# Patient Record
Sex: FEMALE | Race: WHITE | Employment: OTHER
[De-identification: names, ages, dates, MRNs, and addresses within clinical notes are randomized per-mention and may not be internally consistent; named-entity substitution may affect disease eponyms.]

---

## 2019-12-11 ENCOUNTER — RX ONLY (RX ONLY)
Age: 84
End: 2019-12-11

## 2019-12-11 ENCOUNTER — AMBUL SURGICAL CARE (OUTPATIENT)
Dept: URBAN - NONMETROPOLITAN AREA SURGERY 1 | Facility: SURGERY | Age: 84
Setting detail: OPHTHALMOLOGY
End: 2019-12-11
Payer: COMMERCIAL

## 2019-12-11 ENCOUNTER — DOCTOR'S OFFICE (OUTPATIENT)
Dept: URBAN - NONMETROPOLITAN AREA CLINIC 1 | Facility: CLINIC | Age: 84
Setting detail: OPHTHALMOLOGY
End: 2019-12-11
Payer: COMMERCIAL

## 2019-12-11 DIAGNOSIS — H40.033: ICD-10-CM

## 2019-12-11 DIAGNOSIS — H25.22: ICD-10-CM

## 2019-12-11 DIAGNOSIS — H25.11: ICD-10-CM

## 2019-12-11 DIAGNOSIS — H40.032: ICD-10-CM

## 2019-12-11 DIAGNOSIS — H25.041: ICD-10-CM

## 2019-12-11 PROCEDURE — G8918 PT W/O PREOP ORDER IV AB PRO: HCPCS | Performed by: CLINIC/CENTER

## 2019-12-11 PROCEDURE — G8918 PT W/O PREOP ORDER IV AB PRO: HCPCS | Performed by: OPHTHALMOLOGY

## 2019-12-11 PROCEDURE — 99204 OFFICE O/P NEW MOD 45 MIN: CPT | Performed by: OPHTHALMOLOGY

## 2019-12-11 PROCEDURE — 92133 CPTRZD OPH DX IMG PST SGM ON: CPT | Performed by: OPHTHALMOLOGY

## 2019-12-11 PROCEDURE — 66761 REVISION OF IRIS: CPT | Performed by: CLINIC/CENTER

## 2019-12-11 PROCEDURE — G8907 PT DOC NO EVENTS ON DISCHARG: HCPCS | Performed by: CLINIC/CENTER

## 2019-12-11 PROCEDURE — 76512 OPH US DX B-SCAN: CPT | Performed by: OPHTHALMOLOGY

## 2019-12-11 PROCEDURE — 66761 REVISION OF IRIS: CPT | Performed by: OPHTHALMOLOGY

## 2019-12-11 PROCEDURE — G8907 PT DOC NO EVENTS ON DISCHARG: HCPCS | Performed by: OPHTHALMOLOGY

## 2019-12-11 ASSESSMENT — REFRACTION_MANIFEST
OS_VA3: 20/
OD_VA1: 20/
OU_VA: 20/
OS_VA2: 20/
OD_VA3: 20/
OS_VA1: 20/
OU_VA: 20/
OS_VA2: 20/
OD_VA1: 20/
OD_VA2: 20/
OS_VA3: 20/
OS_VA1: 20/
OD_VA3: 20/
OD_VA2: 20/

## 2019-12-11 ASSESSMENT — REFRACTION_CURRENTRX
OD_ADD: +2.75
OD_CYLINDER: 0.00
OD_VPRISM_DIRECTION: BF
OD_AXIS: 180
OS_ADD: +2.75
OS_OVR_VA: 20/
OS_CYLINDER: -1.25
OS_AXIS: 148
OD_OVR_VA: 20/
OD_SPHERE: +3.50
OS_OVR_VA: 20/
OS_VPRISM_DIRECTION: BF
OD_OVR_VA: 20/
OS_OVR_VA: 20/
OS_SPHERE: +3.50
OD_OVR_VA: 20/

## 2019-12-11 ASSESSMENT — REFRACTION_AUTOREFRACTION
OD_AXIS: 78
OS_SPHERE: ERROR
OD_SPHERE: +4.75
OD_CYLINDER: -0.75

## 2019-12-11 ASSESSMENT — SPHEQUIV_DERIVED: OD_SPHEQUIV: 4.375

## 2019-12-11 ASSESSMENT — VISUAL ACUITY
OD_BCVA: 20/HM
OS_BCVA: 20/50--2

## 2019-12-11 ASSESSMENT — CONFRONTATIONAL VISUAL FIELD TEST (CVF)
OD_FINDINGS: FULL
OS_FINDINGS: FULL

## 2020-02-05 ENCOUNTER — DOCTOR'S OFFICE (OUTPATIENT)
Dept: URBAN - NONMETROPOLITAN AREA CLINIC 1 | Facility: CLINIC | Age: 85
Setting detail: OPHTHALMOLOGY
End: 2020-02-05
Payer: COMMERCIAL

## 2020-02-05 DIAGNOSIS — H25.22: ICD-10-CM

## 2020-02-05 PROCEDURE — 92136 OPHTHALMIC BIOMETRY: CPT | Performed by: OPHTHALMOLOGY

## 2020-02-13 ENCOUNTER — AMBUL SURGICAL CARE (OUTPATIENT)
Dept: URBAN - NONMETROPOLITAN AREA SURGERY 1 | Facility: SURGERY | Age: 85
Setting detail: OPHTHALMOLOGY
End: 2020-02-13
Payer: COMMERCIAL

## 2020-02-13 DIAGNOSIS — H25.22: ICD-10-CM

## 2020-02-13 PROCEDURE — G8907 PT DOC NO EVENTS ON DISCHARG: HCPCS | Performed by: OPHTHALMOLOGY

## 2020-02-13 PROCEDURE — G8918 PT W/O PREOP ORDER IV AB PRO: HCPCS | Performed by: CLINIC/CENTER

## 2020-02-13 PROCEDURE — G8918 PT W/O PREOP ORDER IV AB PRO: HCPCS | Performed by: OPHTHALMOLOGY

## 2020-02-13 PROCEDURE — G8907 PT DOC NO EVENTS ON DISCHARG: HCPCS | Performed by: CLINIC/CENTER

## 2020-02-13 PROCEDURE — 66982 XCAPSL CTRC RMVL CPLX WO ECP: CPT | Performed by: CLINIC/CENTER

## 2020-02-13 PROCEDURE — 66982 XCAPSL CTRC RMVL CPLX WO ECP: CPT | Performed by: OPHTHALMOLOGY

## 2020-02-14 ENCOUNTER — RX ONLY (RX ONLY)
Age: 85
End: 2020-02-14

## 2020-02-14 ENCOUNTER — DOCTOR'S OFFICE (OUTPATIENT)
Dept: URBAN - NONMETROPOLITAN AREA CLINIC 1 | Facility: CLINIC | Age: 85
Setting detail: OPHTHALMOLOGY
End: 2020-02-14

## 2020-02-14 DIAGNOSIS — Z96.1: ICD-10-CM

## 2020-02-14 PROCEDURE — 99024 POSTOP FOLLOW-UP VISIT: CPT | Performed by: OPTOMETRIST

## 2020-02-14 ASSESSMENT — REFRACTION_AUTOREFRACTION
OD_AXIS: 78
OD_SPHERE: +4.75
OS_SPHERE: ERROR
OD_CYLINDER: -0.75

## 2020-02-14 ASSESSMENT — VISUAL ACUITY
OD_BCVA: 20/CF 2'
OS_BCVA: 20/50--2

## 2020-02-14 ASSESSMENT — SPHEQUIV_DERIVED: OD_SPHEQUIV: 4.375

## 2020-02-14 ASSESSMENT — REFRACTION_CURRENTRX
OD_AXIS: 180
OD_OVR_VA: 20/
OS_AXIS: 148
OD_ADD: +2.75
OS_OVR_VA: 20/
OS_ADD: +2.75
OD_SPHERE: +3.50
OS_VPRISM_DIRECTION: BF
OS_SPHERE: +3.50
OS_CYLINDER: -1.25
OD_VPRISM_DIRECTION: BF
OD_CYLINDER: 0.00

## 2020-02-19 ENCOUNTER — RX ONLY (RX ONLY)
Age: 85
End: 2020-02-19

## 2020-02-19 ENCOUNTER — DOCTOR'S OFFICE (OUTPATIENT)
Dept: URBAN - NONMETROPOLITAN AREA CLINIC 1 | Facility: CLINIC | Age: 85
Setting detail: OPHTHALMOLOGY
End: 2020-02-19
Payer: COMMERCIAL

## 2020-02-19 DIAGNOSIS — H25.11: ICD-10-CM

## 2020-02-19 DIAGNOSIS — H25.041: ICD-10-CM

## 2020-02-19 DIAGNOSIS — Z96.1: ICD-10-CM

## 2020-02-19 PROCEDURE — 99024 POSTOP FOLLOW-UP VISIT: CPT | Performed by: OPHTHALMOLOGY

## 2020-02-19 ASSESSMENT — REFRACTION_CURRENTRX
OD_OVR_VA: 20/
OS_ADD: +2.75
OD_CYLINDER: 0.00
OS_OVR_VA: 20/
OS_VPRISM_DIRECTION: BF
OS_CYLINDER: -1.25
OS_AXIS: 148
OD_VPRISM_DIRECTION: BF
OD_AXIS: 180
OS_SPHERE: +3.50
OD_SPHERE: +3.50
OD_ADD: +2.75

## 2020-02-19 ASSESSMENT — REFRACTION_AUTOREFRACTION
OD_SPHERE: +4.75
OD_AXIS: 78
OS_SPHERE: ERROR
OD_CYLINDER: -0.75

## 2020-02-19 ASSESSMENT — SPHEQUIV_DERIVED: OD_SPHEQUIV: 4.375

## 2020-02-19 ASSESSMENT — CONFRONTATIONAL VISUAL FIELD TEST (CVF)
OD_FINDINGS: FULL
OS_FINDINGS: FULL

## 2020-02-19 ASSESSMENT — VISUAL ACUITY
OD_BCVA: 20/70+2
OS_BCVA: 20/50--2

## 2020-03-11 ENCOUNTER — DOCTOR'S OFFICE (OUTPATIENT)
Dept: URBAN - NONMETROPOLITAN AREA CLINIC 1 | Facility: CLINIC | Age: 85
Setting detail: OPHTHALMOLOGY
End: 2020-03-11
Payer: COMMERCIAL

## 2020-03-11 DIAGNOSIS — Z96.1: ICD-10-CM

## 2020-03-11 DIAGNOSIS — H25.11: ICD-10-CM

## 2020-03-11 DIAGNOSIS — H25.041: ICD-10-CM

## 2020-03-11 PROCEDURE — 99024 POSTOP FOLLOW-UP VISIT: CPT | Performed by: PHYSICIAN ASSISTANT

## 2020-03-12 PROBLEM — H25.041 CATARACT POST SUBCAPSULAR; RIGHT EYE: Status: ACTIVE | Noted: 2019-12-11

## 2020-03-12 PROBLEM — H25.11 CATARACT NUCLEAR SCLEROSIS; RIGHT EYE: Status: ACTIVE | Noted: 2019-12-11

## 2020-03-12 PROBLEM — H40.033 ANATOMICAL NARROW ANGLE; BOTH EYES: Status: ACTIVE | Noted: 2019-12-11

## 2020-03-12 PROBLEM — Z96.1: Status: ACTIVE | Noted: 2020-02-14

## 2020-03-12 ASSESSMENT — REFRACTION_CURRENTRX
OD_VPRISM_DIRECTION: BF
OS_AXIS: 148
OD_SPHERE: +3.50
OS_ADD: +2.75
OS_CYLINDER: -1.25
OS_SPHERE: +3.50
OS_VPRISM_DIRECTION: BF
OD_CYLINDER: 0.00
OD_AXIS: 180
OS_OVR_VA: 20/
OD_OVR_VA: 20/
OD_ADD: +2.75

## 2020-03-12 ASSESSMENT — SPHEQUIV_DERIVED
OD_SPHEQUIV: 4.375
OS_SPHEQUIV: 0.375

## 2020-03-12 ASSESSMENT — REFRACTION_AUTOREFRACTION
OD_SPHERE: +4.75
OS_SPHERE: +1.00
OD_AXIS: 78
OS_CYLINDER: -1.25
OD_CYLINDER: -0.75
OS_AXIS: 004

## 2020-03-12 ASSESSMENT — VISUAL ACUITY
OS_BCVA: 20/50--2
OD_BCVA: 20/70-2

## 2023-06-21 ENCOUNTER — APPOINTMENT (EMERGENCY)
Dept: CT IMAGING | Facility: HOSPITAL | Age: 88
DRG: 871 | End: 2023-06-21
Payer: COMMERCIAL

## 2023-06-21 ENCOUNTER — APPOINTMENT (EMERGENCY)
Dept: RADIOLOGY | Facility: HOSPITAL | Age: 88
DRG: 871 | End: 2023-06-21
Payer: COMMERCIAL

## 2023-06-21 ENCOUNTER — APPOINTMENT (INPATIENT)
Dept: CT IMAGING | Facility: HOSPITAL | Age: 88
DRG: 871 | End: 2023-06-21
Payer: COMMERCIAL

## 2023-06-21 ENCOUNTER — HOSPITAL ENCOUNTER (INPATIENT)
Facility: HOSPITAL | Age: 88
LOS: 5 days | Discharge: NURSING, REHAB OR LONG-TERM CARE | DRG: 871 | End: 2023-06-26
Attending: EMERGENCY MEDICINE | Admitting: FAMILY MEDICINE
Payer: COMMERCIAL

## 2023-06-21 DIAGNOSIS — R11.10 VOMITING: ICD-10-CM

## 2023-06-21 DIAGNOSIS — A41.9 SEPSIS WITH ACUTE HYPOXIC RESPIRATORY FAILURE WITHOUT SEPTIC SHOCK, DUE TO UNSPECIFIED ORGANISM (HCC): ICD-10-CM

## 2023-06-21 DIAGNOSIS — R65.20 SEPSIS WITH ACUTE HYPOXIC RESPIRATORY FAILURE (HCC): Primary | ICD-10-CM

## 2023-06-21 DIAGNOSIS — R65.20 SEVERE SEPSIS (HCC): ICD-10-CM

## 2023-06-21 DIAGNOSIS — R65.20 SEPSIS WITH ACUTE HYPOXIC RESPIRATORY FAILURE WITHOUT SEPTIC SHOCK, DUE TO UNSPECIFIED ORGANISM (HCC): ICD-10-CM

## 2023-06-21 DIAGNOSIS — R78.81 BACTEREMIA: ICD-10-CM

## 2023-06-21 DIAGNOSIS — R09.02 HYPOXIA: ICD-10-CM

## 2023-06-21 DIAGNOSIS — A41.9 SEVERE SEPSIS (HCC): ICD-10-CM

## 2023-06-21 DIAGNOSIS — J96.01 SEPSIS WITH ACUTE HYPOXIC RESPIRATORY FAILURE WITHOUT SEPTIC SHOCK, DUE TO UNSPECIFIED ORGANISM (HCC): ICD-10-CM

## 2023-06-21 DIAGNOSIS — A41.9 SEPSIS WITH ACUTE HYPOXIC RESPIRATORY FAILURE (HCC): Primary | ICD-10-CM

## 2023-06-21 DIAGNOSIS — N39.0 UTI (URINARY TRACT INFECTION): ICD-10-CM

## 2023-06-21 DIAGNOSIS — J96.01 SEPSIS WITH ACUTE HYPOXIC RESPIRATORY FAILURE (HCC): Primary | ICD-10-CM

## 2023-06-21 PROBLEM — G30.9 ALZHEIMER'S DEMENTIA (HCC): Status: ACTIVE | Noted: 2023-06-21

## 2023-06-21 PROBLEM — I10 HTN (HYPERTENSION): Status: ACTIVE | Noted: 2023-06-21

## 2023-06-21 PROBLEM — M79.662 PAIN OF LEFT CALF: Status: ACTIVE | Noted: 2023-06-21

## 2023-06-21 PROBLEM — F02.80 ALZHEIMER'S DEMENTIA (HCC): Status: ACTIVE | Noted: 2023-06-21

## 2023-06-21 PROBLEM — R77.8 ELEVATED TROPONIN: Status: ACTIVE | Noted: 2023-06-21

## 2023-06-21 PROBLEM — E03.9 HYPOTHYROIDISM: Status: ACTIVE | Noted: 2023-06-21

## 2023-06-21 PROBLEM — I25.10 ATHEROSCLEROTIC HEART DISEASE: Status: ACTIVE | Noted: 2023-06-21

## 2023-06-21 PROBLEM — K74.60 CIRRHOSIS (HCC): Status: ACTIVE | Noted: 2023-06-21

## 2023-06-21 PROBLEM — E78.5 HLD (HYPERLIPIDEMIA): Status: ACTIVE | Noted: 2023-06-21

## 2023-06-21 PROBLEM — J96.00 ACUTE RESPIRATORY FAILURE (HCC): Status: ACTIVE | Noted: 2023-06-21

## 2023-06-21 LAB
2HR DELTA HS TROPONIN: 75 NG/L
4HR DELTA HS TROPONIN: 150 NG/L
ALBUMIN SERPL BCP-MCNC: 4 G/DL (ref 3.5–5)
ALP SERPL-CCNC: 87 U/L (ref 34–104)
ALT SERPL W P-5'-P-CCNC: 19 U/L (ref 7–52)
ANION GAP SERPL CALCULATED.3IONS-SCNC: 8 MMOL/L
APTT PPP: 25 SECONDS (ref 23–37)
AST SERPL W P-5'-P-CCNC: 27 U/L (ref 13–39)
BACTERIA UR QL AUTO: ABNORMAL /HPF
BASE EX.OXY STD BLDV CALC-SCNC: 54.3 % (ref 60–80)
BASE EXCESS BLDV CALC-SCNC: 1.3 MMOL/L
BASOPHILS # BLD AUTO: 0.03 THOUSANDS/ÂΜL (ref 0–0.1)
BASOPHILS NFR BLD AUTO: 0 % (ref 0–1)
BILIRUB SERPL-MCNC: 0.93 MG/DL (ref 0.2–1)
BILIRUB UR QL STRIP: NEGATIVE
BNP SERPL-MCNC: 66 PG/ML (ref 0–100)
BUN SERPL-MCNC: 17 MG/DL (ref 5–25)
CALCIUM SERPL-MCNC: 9.6 MG/DL (ref 8.4–10.2)
CARDIAC TROPONIN I PNL SERPL HS: 159 NG/L
CARDIAC TROPONIN I PNL SERPL HS: 84 NG/L
CARDIAC TROPONIN I PNL SERPL HS: 9 NG/L
CHLORIDE SERPL-SCNC: 99 MMOL/L (ref 96–108)
CLARITY UR: ABNORMAL
CO2 SERPL-SCNC: 31 MMOL/L (ref 21–32)
COLOR UR: ABNORMAL
CREAT SERPL-MCNC: 0.87 MG/DL (ref 0.6–1.3)
EOSINOPHIL # BLD AUTO: 0.18 THOUSAND/ÂΜL (ref 0–0.61)
EOSINOPHIL NFR BLD AUTO: 2 % (ref 0–6)
ERYTHROCYTE [DISTWIDTH] IN BLOOD BY AUTOMATED COUNT: 13.7 % (ref 11.6–15.1)
GFR SERPL CREATININE-BSD FRML MDRD: 57 ML/MIN/1.73SQ M
GLUCOSE SERPL-MCNC: 131 MG/DL (ref 65–140)
GLUCOSE UR STRIP-MCNC: NEGATIVE MG/DL
HCO3 BLDV-SCNC: 29.2 MMOL/L (ref 24–30)
HCT VFR BLD AUTO: 44.5 % (ref 34.8–46.1)
HGB BLD-MCNC: 13.9 G/DL (ref 11.5–15.4)
HGB UR QL STRIP.AUTO: ABNORMAL
IMM GRANULOCYTES # BLD AUTO: 0.04 THOUSAND/UL (ref 0–0.2)
IMM GRANULOCYTES NFR BLD AUTO: 0 % (ref 0–2)
INR PPP: 0.94 (ref 0.84–1.19)
KETONES UR STRIP-MCNC: NEGATIVE MG/DL
LACTATE SERPL-SCNC: 2.1 MMOL/L (ref 0.5–2)
LACTATE SERPL-SCNC: 2.2 MMOL/L (ref 0.5–2)
LEUKOCYTE ESTERASE UR QL STRIP: ABNORMAL
LYMPHOCYTES # BLD AUTO: 1.15 THOUSANDS/ÂΜL (ref 0.6–4.47)
LYMPHOCYTES NFR BLD AUTO: 10 % (ref 14–44)
MAGNESIUM SERPL-MCNC: 1.9 MG/DL (ref 1.9–2.7)
MCH RBC QN AUTO: 29.3 PG (ref 26.8–34.3)
MCHC RBC AUTO-ENTMCNC: 31.2 G/DL (ref 31.4–37.4)
MCV RBC AUTO: 94 FL (ref 82–98)
MONOCYTES # BLD AUTO: 0.44 THOUSAND/ÂΜL (ref 0.17–1.22)
MONOCYTES NFR BLD AUTO: 4 % (ref 4–12)
NEUTROPHILS # BLD AUTO: 10.01 THOUSANDS/ÂΜL (ref 1.85–7.62)
NEUTS SEG NFR BLD AUTO: 84 % (ref 43–75)
NITRITE UR QL STRIP: NEGATIVE
NON-SQ EPI CELLS URNS QL MICRO: ABNORMAL /HPF
NRBC BLD AUTO-RTO: 0 /100 WBCS
O2 CT BLDV-SCNC: 11.3 ML/DL
PCO2 BLDV: 59.7 MM HG (ref 42–50)
PH BLDV: 7.31 [PH] (ref 7.3–7.4)
PH UR STRIP.AUTO: 6 [PH]
PLATELET # BLD AUTO: 171 THOUSANDS/UL (ref 149–390)
PMV BLD AUTO: 9 FL (ref 8.9–12.7)
PO2 BLDV: 31.6 MM HG (ref 35–45)
POTASSIUM SERPL-SCNC: 4.1 MMOL/L (ref 3.5–5.3)
PROCALCITONIN SERPL-MCNC: 0.14 NG/ML
PROT SERPL-MCNC: 8.1 G/DL (ref 6.4–8.4)
PROT UR STRIP-MCNC: ABNORMAL MG/DL
PROTHROMBIN TIME: 12.6 SECONDS (ref 11.6–14.5)
RBC # BLD AUTO: 4.74 MILLION/UL (ref 3.81–5.12)
RBC #/AREA URNS AUTO: ABNORMAL /HPF
SODIUM SERPL-SCNC: 138 MMOL/L (ref 135–147)
SP GR UR STRIP.AUTO: 1.02 (ref 1–1.03)
UROBILINOGEN UR QL STRIP.AUTO: 0.2 E.U./DL
WBC # BLD AUTO: 11.85 THOUSAND/UL (ref 4.31–10.16)
WBC #/AREA URNS AUTO: ABNORMAL /HPF

## 2023-06-21 PROCEDURE — 99223 1ST HOSP IP/OBS HIGH 75: CPT | Performed by: FAMILY MEDICINE

## 2023-06-21 PROCEDURE — 36415 COLL VENOUS BLD VENIPUNCTURE: CPT | Performed by: PHYSICIAN ASSISTANT

## 2023-06-21 PROCEDURE — 71250 CT THORAX DX C-: CPT

## 2023-06-21 PROCEDURE — 87186 SC STD MICRODIL/AGAR DIL: CPT | Performed by: PHYSICIAN ASSISTANT

## 2023-06-21 PROCEDURE — 70450 CT HEAD/BRAIN W/O DYE: CPT

## 2023-06-21 PROCEDURE — 99285 EMERGENCY DEPT VISIT HI MDM: CPT

## 2023-06-21 PROCEDURE — 96365 THER/PROPH/DIAG IV INF INIT: CPT

## 2023-06-21 PROCEDURE — 93005 ELECTROCARDIOGRAM TRACING: CPT

## 2023-06-21 PROCEDURE — 84484 ASSAY OF TROPONIN QUANT: CPT | Performed by: PHYSICIAN ASSISTANT

## 2023-06-21 PROCEDURE — 87086 URINE CULTURE/COLONY COUNT: CPT | Performed by: PHYSICIAN ASSISTANT

## 2023-06-21 PROCEDURE — 71275 CT ANGIOGRAPHY CHEST: CPT

## 2023-06-21 PROCEDURE — 96367 TX/PROPH/DG ADDL SEQ IV INF: CPT

## 2023-06-21 PROCEDURE — 81001 URINALYSIS AUTO W/SCOPE: CPT | Performed by: PHYSICIAN ASSISTANT

## 2023-06-21 PROCEDURE — 85025 COMPLETE CBC W/AUTO DIFF WBC: CPT | Performed by: PHYSICIAN ASSISTANT

## 2023-06-21 PROCEDURE — G1004 CDSM NDSC: HCPCS

## 2023-06-21 PROCEDURE — 74176 CT ABD & PELVIS W/O CONTRAST: CPT

## 2023-06-21 PROCEDURE — 83605 ASSAY OF LACTIC ACID: CPT | Performed by: PHYSICIAN ASSISTANT

## 2023-06-21 PROCEDURE — 87154 CUL TYP ID BLD PTHGN 6+ TRGT: CPT | Performed by: PHYSICIAN ASSISTANT

## 2023-06-21 PROCEDURE — 96376 TX/PRO/DX INJ SAME DRUG ADON: CPT

## 2023-06-21 PROCEDURE — 84145 PROCALCITONIN (PCT): CPT | Performed by: PHYSICIAN ASSISTANT

## 2023-06-21 PROCEDURE — 83605 ASSAY OF LACTIC ACID: CPT

## 2023-06-21 PROCEDURE — 87181 SC STD AGAR DILUTION PER AGT: CPT | Performed by: PHYSICIAN ASSISTANT

## 2023-06-21 PROCEDURE — 80053 COMPREHEN METABOLIC PANEL: CPT | Performed by: PHYSICIAN ASSISTANT

## 2023-06-21 PROCEDURE — 84484 ASSAY OF TROPONIN QUANT: CPT

## 2023-06-21 PROCEDURE — 85610 PROTHROMBIN TIME: CPT | Performed by: PHYSICIAN ASSISTANT

## 2023-06-21 PROCEDURE — 96375 TX/PRO/DX INJ NEW DRUG ADDON: CPT

## 2023-06-21 PROCEDURE — 87076 CULTURE ANAEROBE IDENT EACH: CPT | Performed by: PHYSICIAN ASSISTANT

## 2023-06-21 PROCEDURE — 71045 X-RAY EXAM CHEST 1 VIEW: CPT

## 2023-06-21 PROCEDURE — 82805 BLOOD GASES W/O2 SATURATION: CPT | Performed by: PHYSICIAN ASSISTANT

## 2023-06-21 PROCEDURE — 87040 BLOOD CULTURE FOR BACTERIA: CPT | Performed by: PHYSICIAN ASSISTANT

## 2023-06-21 PROCEDURE — 83880 ASSAY OF NATRIURETIC PEPTIDE: CPT | Performed by: PHYSICIAN ASSISTANT

## 2023-06-21 PROCEDURE — 87077 CULTURE AEROBIC IDENTIFY: CPT | Performed by: PHYSICIAN ASSISTANT

## 2023-06-21 PROCEDURE — 85730 THROMBOPLASTIN TIME PARTIAL: CPT | Performed by: PHYSICIAN ASSISTANT

## 2023-06-21 PROCEDURE — 87081 CULTURE SCREEN ONLY: CPT

## 2023-06-21 PROCEDURE — 83735 ASSAY OF MAGNESIUM: CPT | Performed by: PHYSICIAN ASSISTANT

## 2023-06-21 RX ORDER — FUROSEMIDE 10 MG/ML
20 INJECTION INTRAMUSCULAR; INTRAVENOUS ONCE
Status: COMPLETED | OUTPATIENT
Start: 2023-06-21 | End: 2023-06-21

## 2023-06-21 RX ORDER — POLYETHYLENE GLYCOL 3350 17 G/17G
17 POWDER, FOR SOLUTION ORAL 2 TIMES DAILY
Status: DISCONTINUED | OUTPATIENT
Start: 2023-06-21 | End: 2023-06-26 | Stop reason: HOSPADM

## 2023-06-21 RX ORDER — SENNOSIDES 8.6 MG
1 TABLET ORAL DAILY
Status: DISCONTINUED | OUTPATIENT
Start: 2023-06-22 | End: 2023-06-26 | Stop reason: HOSPADM

## 2023-06-21 RX ORDER — POLYETHYLENE GLYCOL 3350 17 G/17G
17 POWDER, FOR SOLUTION ORAL 2 TIMES DAILY
COMMUNITY

## 2023-06-21 RX ORDER — ONDANSETRON 2 MG/ML
4 INJECTION INTRAMUSCULAR; INTRAVENOUS ONCE
Status: COMPLETED | OUTPATIENT
Start: 2023-06-21 | End: 2023-06-21

## 2023-06-21 RX ORDER — SENNA PLUS 8.6 MG/1
1 TABLET ORAL DAILY
COMMUNITY

## 2023-06-21 RX ORDER — LEVOTHYROXINE SODIUM 0.12 MG/1
125 TABLET ORAL DAILY
COMMUNITY

## 2023-06-21 RX ORDER — MEMANTINE HYDROCHLORIDE 10 MG/1
10 TABLET ORAL 2 TIMES DAILY
COMMUNITY

## 2023-06-21 RX ORDER — LEVOTHYROXINE SODIUM 0.12 MG/1
125 TABLET ORAL
Status: DISCONTINUED | OUTPATIENT
Start: 2023-06-22 | End: 2023-06-26 | Stop reason: HOSPADM

## 2023-06-21 RX ORDER — HEPARIN SODIUM 5000 [USP'U]/ML
5000 INJECTION, SOLUTION INTRAVENOUS; SUBCUTANEOUS EVERY 8 HOURS SCHEDULED
Status: DISCONTINUED | OUTPATIENT
Start: 2023-06-21 | End: 2023-06-26 | Stop reason: HOSPADM

## 2023-06-21 RX ORDER — ONDANSETRON 2 MG/ML
4 INJECTION INTRAMUSCULAR; INTRAVENOUS EVERY 6 HOURS PRN
Status: DISCONTINUED | OUTPATIENT
Start: 2023-06-21 | End: 2023-06-26 | Stop reason: HOSPADM

## 2023-06-21 RX ORDER — ASPIRIN 81 MG/1
81 TABLET, CHEWABLE ORAL DAILY
Status: DISCONTINUED | OUTPATIENT
Start: 2023-06-22 | End: 2023-06-26 | Stop reason: HOSPADM

## 2023-06-21 RX ORDER — ACETAMINOPHEN 325 MG/1
650 TABLET ORAL EVERY 6 HOURS PRN
COMMUNITY

## 2023-06-21 RX ORDER — MEMANTINE HYDROCHLORIDE 10 MG/1
10 TABLET ORAL 2 TIMES DAILY
Status: DISCONTINUED | OUTPATIENT
Start: 2023-06-21 | End: 2023-06-26 | Stop reason: HOSPADM

## 2023-06-21 RX ORDER — BISACODYL 10 MG
10 SUPPOSITORY, RECTAL RECTAL
COMMUNITY

## 2023-06-21 RX ORDER — MULTIVIT-MIN/IRON FUM/FOLIC AC 7.5 MG-4
1 TABLET ORAL DAILY
COMMUNITY

## 2023-06-21 RX ORDER — ASPIRIN 81 MG/1
81 TABLET, CHEWABLE ORAL DAILY
COMMUNITY

## 2023-06-21 RX ORDER — LANOLIN ALCOHOL/MO/W.PET/CERES
CREAM (GRAM) TOPICAL DAILY
COMMUNITY

## 2023-06-21 RX ORDER — VANCOMYCIN HYDROCHLORIDE 1 G/200ML
15 INJECTION, SOLUTION INTRAVENOUS ONCE
Status: COMPLETED | OUTPATIENT
Start: 2023-06-21 | End: 2023-06-21

## 2023-06-21 RX ORDER — SODIUM CHLORIDE, SODIUM GLUCONATE, SODIUM ACETATE, POTASSIUM CHLORIDE, MAGNESIUM CHLORIDE, SODIUM PHOSPHATE, DIBASIC, AND POTASSIUM PHOSPHATE .53; .5; .37; .037; .03; .012; .00082 G/100ML; G/100ML; G/100ML; G/100ML; G/100ML; G/100ML; G/100ML
75 INJECTION, SOLUTION INTRAVENOUS CONTINUOUS
Status: DISPENSED | OUTPATIENT
Start: 2023-06-21 | End: 2023-06-22

## 2023-06-21 RX ORDER — ACETAMINOPHEN 325 MG/1
650 TABLET ORAL EVERY 6 HOURS PRN
Status: DISCONTINUED | OUTPATIENT
Start: 2023-06-21 | End: 2023-06-26 | Stop reason: HOSPADM

## 2023-06-21 RX ORDER — POTASSIUM CHLORIDE 750 MG/1
10 TABLET, FILM COATED, EXTENDED RELEASE ORAL 2 TIMES DAILY
COMMUNITY

## 2023-06-21 RX ORDER — MAGNESIUM HYDROXIDE/ALUMINUM HYDROXICE/SIMETHICONE 120; 1200; 1200 MG/30ML; MG/30ML; MG/30ML
30 SUSPENSION ORAL EVERY 6 HOURS PRN
Status: DISCONTINUED | OUTPATIENT
Start: 2023-06-21 | End: 2023-06-26 | Stop reason: HOSPADM

## 2023-06-21 RX ORDER — VANCOMYCIN HYDROCHLORIDE 500 MG/100ML
500 INJECTION, SOLUTION INTRAVENOUS ONCE
Status: COMPLETED | OUTPATIENT
Start: 2023-06-21 | End: 2023-06-21

## 2023-06-21 RX ADMIN — ONDANSETRON 4 MG: 2 INJECTION INTRAMUSCULAR; INTRAVENOUS at 14:36

## 2023-06-21 RX ADMIN — SODIUM CHLORIDE, SODIUM GLUCONATE, SODIUM ACETATE, POTASSIUM CHLORIDE, MAGNESIUM CHLORIDE, SODIUM PHOSPHATE, DIBASIC, AND POTASSIUM PHOSPHATE 75 ML/HR: .53; .5; .37; .037; .03; .012; .00082 INJECTION, SOLUTION INTRAVENOUS at 19:09

## 2023-06-21 RX ADMIN — VANCOMYCIN HYDROCHLORIDE 1000 MG: 1 INJECTION, SOLUTION INTRAVENOUS at 15:28

## 2023-06-21 RX ADMIN — FUROSEMIDE 20 MG: 10 INJECTION, SOLUTION INTRAMUSCULAR; INTRAVENOUS at 16:45

## 2023-06-21 RX ADMIN — PIPERACILLIN AND TAZOBACTAM 3.38 G: 36; 4.5 INJECTION, POWDER, FOR SOLUTION INTRAVENOUS at 21:44

## 2023-06-21 RX ADMIN — HEPARIN SODIUM 5000 UNITS: 5000 INJECTION INTRAVENOUS; SUBCUTANEOUS at 21:44

## 2023-06-21 RX ADMIN — PIPERACILLIN AND TAZOBACTAM 3.38 G: 36; 4.5 INJECTION, POWDER, FOR SOLUTION INTRAVENOUS at 14:39

## 2023-06-21 RX ADMIN — MEMANTINE 10 MG: 10 TABLET ORAL at 19:09

## 2023-06-21 RX ADMIN — POLYETHYLENE GLYCOL 3350 17 G: 17 POWDER, FOR SOLUTION ORAL at 19:09

## 2023-06-21 RX ADMIN — IOHEXOL 100 ML: 350 INJECTION, SOLUTION INTRAVENOUS at 22:13

## 2023-06-21 RX ADMIN — VANCOMYCIN HYDROCHLORIDE 500 MG: 500 INJECTION, SOLUTION INTRAVENOUS at 19:29

## 2023-06-21 RX ADMIN — SODIUM CHLORIDE 500 ML: 0.9 INJECTION, SOLUTION INTRAVENOUS at 14:35

## 2023-06-21 RX ADMIN — FUROSEMIDE 20 MG: 10 INJECTION, SOLUTION INTRAMUSCULAR; INTRAVENOUS at 15:28

## 2023-06-21 NOTE — SEPSIS NOTE
"  Sepsis Note   [place] 80 y o  female MRN: [id number]  Unit/Bed#: ED 02 Encounter: [id number]       Initial Sepsis Screening     [address] Name 06/21/23 1647                Is the patient's history suggestive of a new or worsening infection? Yes (Proceed)  -SB        Suspected source of infection suspect infection, source unknown  -SB        Indicate SIRS criteria Tachypnea > 20 resp per min; Tachycardia > 90 bpm;Leukocytosis (WBC > 01464 IJL) OR Leukopenia (WBC <4000 IJL) OR Bandemia (WBC >10% bands)  -SB        Are two or more of the above signs & symptoms of infection both present and new to the patient? Yes (Proceed)  -SB        Assess for evidence of organ dysfunction: Are any of the below criteria present within 6 hours of suspected infection and SIRS criteria that are NOT considered to be chronic conditions? Lactate > 2 0  -SB        Date of presentation of severe sepsis 06/21/23  -SB        Time of presentation of severe sepsis 1647  -SB        Sepsis Note: Click \"NEXT\" below (NOT \"close\") to generate sepsis note based on above information  YES (proceed by clicking \"NEXT\")  -SB              User Key  (r) = Recorded By, (t) = Taken By, (c) = Cosigned By    [address] Name Provider Type     Abi Borden Physician Assistant                    Body mass index is 33 07 kg/m²    Wt Readings from Last 1 Encounters:   06/21/23 87 4 kg (192 lb 10 9 oz)     IBW (Ideal Body Weight): 54 7 kg    Ideal body weight: 54 7 kg (120 lb 9 5 oz)  Adjusted ideal body weight: 67 8 kg (149 lb 6 8 oz)  "

## 2023-06-21 NOTE — ASSESSMENT & PLAN NOTE
Patient with no baseline oxygen needs reports the ER with shortness of breath and hypoxia at nursing home  Possibly in the setting of aspiration given had episode of vomiting prior  Also has some left calf pain, will get CTA PE study to rule out PE with concurrent tachycardia  Currently on 2 L in the ER  Wean oxygen as tolerated

## 2023-06-21 NOTE — ED PROVIDER NOTES
History  Chief Complaint   Patient presents with   • Shortness of Breath     Sob and chest pain and 1 episode of vomiting while at SNF, pre-hospital treatment of Nitro and O2 4L via NC       The patient is a 81 y/o male with a past medical history of dementia who is coming in for evaluation from [address] [place] [address] for evaluation of shortness of breath chest pain and emesis that started today prior to arrival   She home expressed with the patient developed shortness of breath and chest pain today and was found to have a pulse ox of 83% therefore she was placed on 10 L nasal cannula oxygen  She states she was given 1 nitro and breathing treatment and EMS was called  Patient also then had 1 bout of emesis at the facility  On arrival the patient was on room air at 93%      History provided by:  EMS personnel and nursing home  History limited by:  Dementia  Shortness of Breath  Duration:  1 day  Timing:  Constant  Progression:  Unchanged  Chronicity:  New  Associated symptoms: vomiting    Associated symptoms: no abdominal pain, no chest pain, no cough, no ear pain, no fever, no rash, no sore throat and no wheezing    Vomiting:     Number of occurrences:  1    Progression:  Unable to specify      None       No past medical history on file  No past surgical history on file  No family history on file  I have reviewed and agree with the history as documented  No existing history information found  No existing history information found  Review of Systems   Constitutional: Negative for chills and fever  HENT: Negative for ear pain and sore throat  Eyes: Negative for pain and visual disturbance  Respiratory: Positive for shortness of breath  Negative for cough and wheezing  Cardiovascular: Negative for chest pain, palpitations and leg swelling  Gastrointestinal: Positive for vomiting  Negative for abdominal pain  Genitourinary: Negative for dysuria and hematuria  Musculoskeletal: Negative for arthralgias and back pain  Skin: Negative for color change and rash  Neurological: Negative for dizziness, seizures and syncope  All other systems reviewed and are negative  Physical Exam  Physical Exam  Vitals and nursing note reviewed  Constitutional:       General: She is not in acute distress  Appearance: She is well-developed  HENT:      Head: Normocephalic and atraumatic  Right Ear: External ear normal       Left Ear: External ear normal    Eyes:      Extraocular Movements: Extraocular movements intact  Pupils: Pupils are equal, round, and reactive to light  Cardiovascular:      Rate and Rhythm: Regular rhythm  Tachycardia present  Pulses: Normal pulses  Heart sounds: No murmur heard  Pulmonary:      Effort: Pulmonary effort is normal  Tachypnea present  No respiratory distress  Breath sounds: Decreased breath sounds present  No wheezing  Chest:      Chest wall: No tenderness  Abdominal:      General: Bowel sounds are normal       Palpations: Abdomen is soft  There is no mass  Tenderness: There is no abdominal tenderness  There is no rebound  Hernia: No hernia is present  Musculoskeletal:      Cervical back: Normal range of motion and neck supple  Right lower leg: Edema present  Left lower leg: Edema present  Skin:     General: Skin is warm and dry  Capillary Refill: Capillary refill takes less than 2 seconds  Neurological:      General: No focal deficit present  Mental Status: She is confused        Coordination: Coordination normal    Psychiatric:         Behavior: Behavior normal          Vital Signs  ED Triage Vitals   Temperature Pulse Respirations Blood Pressure SpO2   06/21/23 1359 06/21/23 1359 06/21/23 1359 06/21/23 1401 06/21/23 1359   98 4 °F (36 9 °C) (!) 129 20 (!) 245/110 93 %      Temp Source Heart Rate Source Patient Position - Orthostatic VS BP Location FiO2 (%)   06/21/23 1357 06/21/23 1359 06/21/23 1359 06/21/23 1359 --   Temporal Monitor Sitting Right arm       Pain Score       06/21/23 1359       No Pain           Vitals:    06/21/23 1501 06/21/23 1530 06/21/23 1547 06/21/23 1645   BP: (!) 178/74  162/69 121/59   Pulse: (!) 109 104 (!) 106 100   Patient Position - Orthostatic VS: Sitting  Sitting Lying         Visual Acuity      ED Medications  Medications   ondansetron (ZOFRAN) injection 4 mg (4 mg Intravenous Given 6/21/23 1436)   sodium chloride 0 9 % bolus 500 mL (0 mL Intravenous Stopped 6/21/23 1535)   piperacillin-tazobactam (ZOSYN) 3 375 g in sodium chloride 0 9 % 100 mL IVPB (0 g Intravenous Stopped 6/21/23 1509)   vancomycin (VANCOCIN) IVPB (premix in dextrose) 1,000 mg 200 mL (0 mg Intravenous Stopped 6/21/23 1628)   furosemide (LASIX) injection 20 mg (20 mg Intravenous Given 6/21/23 1528)   furosemide (LASIX) injection 20 mg (20 mg Intravenous Given 6/21/23 1645)       Diagnostic Studies  Results Reviewed     Procedure Component Value Units Date/Time    Lactic acid 2 Hours [[id number]]  (Abnormal) Collected: 06/21/23 1622    Lab Status: Final result Specimen: Blood from Arm, Right Updated: 06/21/23 1649     LACTIC ACID 2 1 mmol/L     Narrative:      Result may be elevated if tourniquet was used during collection  HS Troponin I 2hr [[id number]] Collected: 06/21/23 1622    Lab Status:  In process Specimen: Blood from Arm, Right Updated: 06/21/23 1627    B-Type Natriuretic Peptide(BNP) [[id number]]  (Normal) Collected: 06/21/23 1416    Lab Status: Final result Specimen: Blood from Arm, Right Updated: 06/21/23 1617     BNP 66 pg/mL     HS Troponin I 4hr [[id number]]     Lab Status: No result Specimen: Blood     Procalcitonin [[id number]]  (Normal) Collected: 06/21/23 1416    Lab Status: Final result Specimen: Blood from Arm, Right Updated: 06/21/23 1558     Procalcitonin 0 14 ng/ml     Comprehensive metabolic panel [[id number]] Collected: 06/21/23 1416    Lab Status: Final result Specimen: Blood from Arm, Right Updated: 06/21/23 1525     Sodium 138 mmol/L      Potassium 4 1 mmol/L      Chloride 99 mmol/L      CO2 31 mmol/L      ANION GAP 8 mmol/L      BUN 17 mg/dL      Creatinine 0 87 mg/dL      Glucose 131 mg/dL      Calcium 9 6 mg/dL      AST 27 U/L      ALT 19 U/L      Alkaline Phosphatase 87 U/L      Total Protein 8 1 g/dL      Albumin 4 0 g/dL      Total Bilirubin 0 93 mg/dL      eGFR 57 ml/min/1 73sq m     Narrative:      [place] guidelines for Chronic Kidney Disease (CKD):   •  Stage 1 with normal or high GFR (GFR > 90 mL/min/1 73 square meters)  •  Stage 2 Mild CKD (GFR = 60-89 mL/min/1 73 square meters)  •  Stage 3A Moderate CKD (GFR = 45-59 mL/min/1 73 square meters)  •  Stage 3B Moderate CKD (GFR = 30-44 mL/min/1 73 square meters)  •  Stage 4 Severe CKD (GFR = 15-29 mL/min/1 73 square meters)  •  Stage 5 End Stage CKD (GFR <15 mL/min/1 73 square meters)  Note: GFR calculation is accurate only with a steady state creatinine    Magnesium [[id number]]  (Normal) Collected: 06/21/23 1416    Lab Status: Final result Specimen: Blood from Arm, Right Updated: 06/21/23 1525     Magnesium 1 9 mg/dL     Lactic acid [[id number]]  (Abnormal) Collected: 06/21/23 1416    Lab Status: Final result Specimen: Blood from Arm, Right Updated: 06/21/23 1523     LACTIC ACID 2 2 mmol/L     Narrative:      Result may be elevated if tourniquet was used during collection  Urine Microscopic [[id number]]  (Abnormal) Collected: 06/21/23 1434    Lab Status: Final result Specimen: Urine, Straight Cath Updated: 06/21/23 1516     RBC, UA 2-4 /hpf      WBC, UA 30-50 /hpf      Epithelial Cells Occasional /hpf      Bacteria, UA Occasional /hpf     Urine culture [[id number]] Collected: 06/21/23 1434    Lab Status:  In process Specimen: Urine, Straight Cath Updated: 06/21/23 1516    HS Troponin 0hr (reflex protocol) [[id number]]  (Normal) Collected: 06/21/23 1416    Lab Status: Final result Specimen: Blood from Arm, Right Updated: 06/21/23 1508     hs TnI 0hr 9 ng/L     Protime-INR [[id number]]  (Normal) Collected: 06/21/23 1416    Lab Status: Final result Specimen: Blood from Arm, Right Updated: 06/21/23 1458     Protime 12 6 seconds      INR 0 94    APTT [[id number]]  (Normal) Collected: 06/21/23 1416    Lab Status: Final result Specimen: Blood from Arm, Right Updated: 06/21/23 1458     PTT 25 seconds     Blood culture #1 [[id number]] Collected: 06/21/23 1439    Lab Status:  In process Specimen: Blood from Hand, Right Updated: 06/21/23 1442    UA w Reflex to Microscopic w Reflex to Culture [[id number]]  (Abnormal) Collected: 06/21/23 1434    Lab Status: Final result Specimen: Urine, Straight Cath Updated: 06/21/23 1442     Color, UA Straw     Clarity, UA Slightly Cloudy     Specific Gravity, UA 1 025     pH, UA 6 0     Leukocytes, UA Small     Nitrite, UA Negative     Protein, UA 30 (1+) mg/dl      Glucose, UA Negative mg/dl      Ketones, UA Negative mg/dl      Urobilinogen, UA 0 2 E U /dl      Bilirubin, UA Negative     Occult Blood, UA Small    Blood gas, Venous [[id number]]  (Abnormal) Collected: 06/21/23 1416    Lab Status: Final result Specimen: Blood from Arm, Right Updated: 06/21/23 1429     pH, Mikel 7 307     pCO2, Mikel 59 7 mm Hg      pO2, Mikel 31 6 mm Hg      HCO3, Mikel 29 2 mmol/L      Base Excess, Mikel 1 3 mmol/L      O2 Content, Mikel 11 3 ml/dL      O2 HGB, VENOUS 54 3 %     CBC and differential [[id number]]  (Abnormal) Collected: 06/21/23 1416    Lab Status: Final result Specimen: Blood from Arm, Right Updated: 06/21/23 1427     WBC 11 85 Thousand/uL      RBC 4 74 Million/uL      Hemoglobin 13 9 g/dL      Hematocrit 44 5 %      MCV 94 fL      MCH 29 3 pg      MCHC 31 2 g/dL      RDW 13 7 %      MPV 9 0 fL      Platelets 378 Thousands/uL      nRBC 0 /100 WBCs      Neutrophils Relative 84 %      Immat GRANS % 0 %      Lymphocytes Relative 10 %      Monocytes Relative 4 %      Eosinophils Relative 2 % Basophils Relative 0 %      Neutrophils Absolute 10 01 Thousands/µL      Immature Grans Absolute 0 04 Thousand/uL      Lymphocytes Absolute 1 15 Thousands/µL      Monocytes Absolute 0 44 Thousand/µL      Eosinophils Absolute 0 18 Thousand/µL      Basophils Absolute 0 03 Thousands/µL     Blood culture #2 [[id number]] Collected: 06/21/23 1416    Lab Status: In process Specimen: Blood from Arm, Right Updated: 06/21/23 1425                 CT head without contrast   Final Result by Sergey Lima MD (06/21 1543)      No acute intracranial abnormality  Chronic microangiopathic changes  Workstation performed: [id number]         CT chest abdomen pelvis wo contrast   Final Result by Sergey Lima MD (06/21 1636)      1  Nonspecific peripheral groundglass opacities, particularly in the right upper lobe, present previously though mildly increased  This may represent chronic interstitial lung disease  No focal consolidation  2   Mild perivesical stranding suggestive of cystitis  3   Hepatic cirrhosis  4   Cholelithiasis  5   Pancreatic cyst, new or increased from 2019  This is of unlikely clinical significance in a patient of this age though would be better evaluated with contrast-enhanced MRI/MRCP                          Workstation performed: [id number]         XR chest portable   Final Result by Carrillo Amador MD (06/21 1612)      Mild cardiomegaly      Mild vascular congestion      Small right pleural effusion                  Workstation performed: [id number]                    Procedures  ECG 12 Lead Documentation Only    Date/Time: 6/21/2023 2:16 PM    Performed by: Jacklyn Núñez PA-C  Authorized by: Jacklyn Núñez PA-C    Indications / Diagnosis:  Cp,sob  Patient location:  ED  Previous ECG:     Previous ECG:  Unavailable  Rate:     ECG rate assessment: tachycardic    Rhythm:     Rhythm: sinus bradycardia and sinus tachycardia    ST segments:     ST segments:  Normal    CriticalCare Time    Date/Time: 6/21/2023 4:38 PM    Performed by: Rodney Perez PA-C  Authorized by: Rodney Perez PA-C    Critical care provider statement:     Critical care time (minutes):  20    Critical care time was exclusive of:  Separately billable procedures and treating other patients    Critical care was necessary to treat or prevent imminent or life-threatening deterioration of the following conditions:  Respiratory failure    Critical care was time spent personally by me on the following activities:  Obtaining history from patient or surrogate, blood draw for specimens, evaluation of patient's response to treatment, examination of patient, interpretation of cardiac output measurements, ordering and performing treatments and interventions, ordering and review of laboratory studies, ordering and review of radiographic studies, re-evaluation of patient's condition, review of old charts and development of treatment plan with patient or surrogate    I assumed direction of critical care for this patient from another provider in my specialty: no               ED Course  ED Course as of 06/21/23 1653   Wed Jun 21, 2023   1450 Leukocytes, UA(!): Small   1502 SpO2: 98 %  ON 2 lnc NOW   1528 LACTIC ACID(!!): 2 2   1623 BNP: 66   1643 1  Nonspecific peripheral groundglass opacities, particularly in the right upper lobe, present previously though mildly increased  This may represent chronic interstitial lung disease  No focal consolidation      2   Mild perivesical stranding suggestive of cystitis      3  Hepatic cirrhosis      4  Cholelithiasis      5  Pancreatic cyst, new or increased from 2019   This is of unlikely clinical significance in a patient of this age though would be better evaluated with contrast-enhanced MRI/MRCP                                Initial Sepsis Screening     Row Name 06/21/23 1647                Is the patient's history suggestive of a new or "worsening infection? Yes (Proceed)  -SB        Suspected source of infection suspect infection, source unknown  -SB        Indicate SIRS criteria Tachypnea > 20 resp per min; Tachycardia > 90 bpm;Leukocytosis (WBC > 91442 IJL) OR Leukopenia (WBC <4000 IJL) OR Bandemia (WBC >10% bands)  -SB        Are two or more of the above signs & symptoms of infection both present and new to the patient? Yes (Proceed)  -SB        Assess for evidence of organ dysfunction: Are any of the below criteria present within 6 hours of suspected infection and SIRS criteria that are NOT considered to be chronic conditions? Lactate > 2 0  -SB        Date of presentation of severe sepsis 06/21/23  -SB        Time of presentation of severe sepsis 1647  -SB        Sepsis Note: Click \"NEXT\" below (NOT \"close\") to generate sepsis note based on above information  YES (proceed by clicking \"NEXT\")  -SB              User Key  (r) = Recorded By, (t) = Taken By, (c) = Cosigned By    [address] Name Provider Type    GOKUL Mcguire PA-C Physician Assistant                SBIRT 20yo+    Flowsheet Row Most Recent Value   Initial Alcohol Screen: US AUDIT-C     1  How often do you have a drink containing alcohol? 0 Filed at: 06/21/2023 1419   2  How many drinks containing alcohol do you have on a typical day you are drinking? 0 Filed at: 06/21/2023 1419   3a  Male UNDER 65: How often do you have five or more drinks on one occasion? 0 Filed at: 06/21/2023 1419   3b  FEMALE Any Age, or MALE 65+: How often do you have 4 or more drinks on one occassion? 0 Filed at: 06/21/2023 1419   Audit-C Score 0 Filed at: 06/21/2023 1419   SANCHEZ: How many times in the past year have you    Used an illegal drug or used a prescription medication for non-medical reasons?  Never Filed at: 06/21/2023 1419                    Medical Decision Making  The patient is a 81 y/o male with a past medical history of dementia who is coming in for evaluation from [facility] nursing facility " [place] for evaluation of shortness of breath chest pain and emesis that started today prior to arrival   She home expressed with the patient developed shortness of breath and chest pain today and was found to have a pulse ox of 83% therefore she was placed on 10 L nasal cannula oxygen  She states she was given 1 nitro and breathing treatment and EMS was called  Patient also then had 1 bout of emesis at the facility  On arrival the patient was on room air at 93%       patient upon arrival was confused but following directions  Patient was hypoxic on room air  Placed on 2 L nasal cannula oxygen was having sinus tachycardia  Hypertensive  Was vomiting  Vomiting stopped after the Zofran  Patient has no history of oxygen use at the nursing facility  No noted h/o CHF or lasix use  Patient was poor historian most history was given by EMS and nursing home  Was just treated with dicyclomine for her cellulitis of her lower legs  She was afebrile but tachycardic  Patient did have vomiting  Patient may have aspirated or this my be  bacteremia from UTI or cellulitis  Patient was empirically covered with Zosyn and vancomycin for bacteremia or aspiration pneumonia  Lab work illustrated mild leukocytosis and lactic acidosis  Patient was also given a small fluid bolus and Lasix IV  Kidney function was unremarkable and at baseline  BNP and trop normal   Patient seemed to be in some degree of fluid overload on physical examination but tachycardic  Concern for infection so covered with vancomycin and Zosyn because of the concern upon arrival of aspiration pneumonia  She was found to have UTI  X-ray and CT scan only noted vascular congestion no pneumonia  Update daughter about admission patient's daughter was in agreement with treatment plan    Spoke with hospitalist service for admission was in agreement treatment plan      Amount and/or Complexity of [address]: EMS  Labs: ordered  Decision-making details documented in ED Course  Radiology: ordered  Decision-making details documented in ED Course  ECG/medicine tests: ordered  Decision-making details documented in ED Course  Discussion of management or test interpretation with external provider(s): Dr Migue López drug management  Decision regarding hospitalization  Disposition  Final diagnoses:   Hypoxia   Vomiting   UTI (urinary tract infection)   Severe sepsis ([facility], [place] [address] )     Time reflects when diagnosis was documented in both MDM as applicable and the Disposition within this note     Time User Action Codes Description Comment    6/21/2023  2:56 PM Lexine General Add [R09 02] Hypoxia     6/21/2023  3:28 PM Lexine General Add [R11 10] Vomiting     6/21/2023  4:43 PM Lexine General Add [N39 0] UTI (urinary tract infection)     6/21/2023  4:48 PM Lexine General Add [A41 9,  R65 20] Severe sepsis [facility])       ED Disposition     ED Disposition   Admit    Condition   Stable    Date/Time   Wed Jun 21, 2023  4:48 PM    Comment   Case was discussed with irvin and the patient's admission status was agreed to be Admission Status: inpatient status to the service of Dr Washington Hidden              Follow-up Information    None         Patient's Medications    No medications on file       No discharge procedures on file      PDMP Review     None          ED Provider  Electronically Signed by           Orion Monique PA-C  06/21/23 3278

## 2023-06-21 NOTE — ASSESSMENT & PLAN NOTE
Has noted history of hypertension in chart  Not maintained on antihypertensives  Actually hypertensive in the ER, given Lasix x2  Now is normotensive  Monitor in setting of sepsis

## 2023-06-21 NOTE — ASSESSMENT & PLAN NOTE
Presents to ED with shortness of breath, hypoxia and vomiting  • SIRS met: SIRS: 2/4; tachycardia, tachypnea  • Source of infection: Possibly aspiration versus UTI  • She was recently treated for cellulitis however lower extremities do not appear cellulitic  • CT chest abdomen pelvis showing nonspecific peripheral groundglass opacities in the right upper lobe, was present previously however is now mildly increased, most likely chronic interstitial lung disease no focal consolidation, mild perivesical stranding suggestive of cystitis, no infectious interabdominal pathology  Lab Results   Component Value Date    WBC 11 85 (H) 06/21/2023    PROCALCITONI 0 14 06/21/2023    LACTICACID 2 1 ([place]) 06/21/2023       • IVF given in total of 500 mL, followed by 2 doses of 20 mg IV Lasix given in the ER  o Would continue gentle IV fluids given elevated lactic  • Cultures   o Blood cultures pending  o UA positive with reflex culture pending  o MRSA culture ordered  • IV antibiotics; Zosyn/vancomycin initially given in the ED; will continue both; follow up with cultures and deescalate as appropriate, patient has allergy to ceftin

## 2023-06-21 NOTE — ED NOTES
Patient incontinent of urine at this time - new linens and brief placed     Yoan Corbin RN  06/21/23 9111

## 2023-06-21 NOTE — PLAN OF CARE
Problem: Potential for Falls  Goal: Patient will remain free of falls  Description: INTERVENTIONS:  - Educate patient/family on patient safety including physical limitations  - Instruct patient to call for assistance with activity   - Consult OT/PT to assist with strengthening/mobility   - Keep Call bell within reach  - Keep bed low and locked with side rails adjusted as appropriate  - Keep care items and personal belongings within reach  - Initiate and maintain comfort rounds  - Make Fall Risk Sign visible to staff  - Offer Toileting every 2 Hours, in advance of need  - Initiate/Maintain bed/chair alarm  - Obtain necessary fall risk management equipment: bed/chair alarm  - Apply yellow socks and bracelet for high fall risk patients  - Consider moving patient to room near nurses station  Outcome: Progressing     Problem: MOBILITY - ADULT  Goal: Maintain or return to baseline ADL function  Description: INTERVENTIONS:  -  Assess patient's ability to carry out ADLs; assess patient's baseline for ADL function and identify physical deficits which impact ability to perform ADLs (bathing, care of mouth/teeth, toileting, grooming, dressing, etc )  - Assess/evaluate cause of self-care deficits   - Assess range of motion  - Assess patient's mobility; develop plan if impaired  - Assess patient's need for assistive devices and provide as appropriate  - Encourage maximum independence but intervene and supervise when necessary  - Involve family in performance of ADLs  - Assess for home care needs following discharge   - Consider OT consult to assist with ADL evaluation and planning for discharge  - Provide patient education as appropriate  Outcome: Progressing  Goal: Maintains/Returns to pre admission functional level  Description: INTERVENTIONS:  - Perform BMAT or MOVE assessment daily    - Set and communicate daily mobility goal to care team and patient/family/caregiver     - Collaborate with rehabilitation services on mobility goals if consulted  - Perform Range of Motion 3 times a day  - Reposition patient every 2 hours    - Dangle patient 3 times a day  - Stand patient 3 times a day  - Ambulate patient 3 times a day  - Out of bed to chair 3 times a day   - Out of bed for meals 3 times a day  - Out of bed for toileting  - Record patient progress and toleration of activity level   Outcome: Progressing     Problem: PAIN - ADULT  Goal: Verbalizes/displays adequate comfort level or baseline comfort level  Description: Interventions:  - Encourage patient to monitor pain and request assistance  - Assess pain using appropriate pain scale  - Administer analgesics based on type and severity of pain and evaluate response  - Implement non-pharmacological measures as appropriate and evaluate response  - Consider cultural and social influences on pain and pain management  - Notify physician/advanced practitioner if interventions unsuccessful or patient reports new pain  Outcome: Progressing     Problem: INFECTION - ADULT  Goal: Absence or prevention of progression during hospitalization  Description: INTERVENTIONS:  - Assess and monitor for signs and symptoms of infection  - Monitor lab/diagnostic results  - Monitor all insertion sites, i e  indwelling lines, tubes, and drains  - Monitor endotracheal if appropriate and nasal secretions for changes in amount and color  - [place] appropriate cooling/warming therapies per order  - Administer medications as ordered  - Instruct and encourage patient and family to use good hand hygiene technique  - Identify and instruct in appropriate isolation precautions for identified infection/condition  Outcome: Progressing  Goal: Absence of fever/infection during neutropenic period  Description: INTERVENTIONS:  - Monitor WBC    Outcome: Progressing     Problem: SAFETY ADULT  Goal: Patient will remain free of falls  Description: INTERVENTIONS:  - Educate patient/family on patient safety including physical limitations  - Instruct patient to call for assistance with activity   - Consult OT/PT to assist with strengthening/mobility   - Keep Call bell within reach  - Keep bed low and locked with side rails adjusted as appropriate  - Keep care items and personal belongings within reach  - Initiate and maintain comfort rounds  - Make Fall Risk Sign visible to staff  - Offer Toileting every 2 Hours, in advance of need  - Initiate/Maintain bed/chair alarm  - Obtain necessary fall risk management equipment: bed/chair alarm  - Apply yellow socks and bracelet for high fall risk patients  - Consider moving patient to room near nurses station  Outcome: Progressing  Goal: Maintain or return to baseline ADL function  Description: INTERVENTIONS:  -  Assess patient's ability to carry out ADLs; assess patient's baseline for ADL function and identify physical deficits which impact ability to perform ADLs (bathing, care of mouth/teeth, toileting, grooming, dressing, etc )  - Assess/evaluate cause of self-care deficits   - Assess range of motion  - Assess patient's mobility; develop plan if impaired  - Assess patient's need for assistive devices and provide as appropriate  - Encourage maximum independence but intervene and supervise when necessary  - Involve family in performance of ADLs  - Assess for home care needs following discharge   - Consider OT consult to assist with ADL evaluation and planning for discharge  - Provide patient education as appropriate  Outcome: Progressing  Goal: Maintains/Returns to pre admission functional level  Description: INTERVENTIONS:  - Perform BMAT or MOVE assessment daily    - Set and communicate daily mobility goal to care team and patient/family/caregiver  - Collaborate with rehabilitation services on mobility goals if consulted  - Perform Range of Motion 3 times a day  - Reposition patient every 2 hours    - Dangle patient 3 times a day  - Stand patient 3 times a day  - Ambulate patient 3 times a day  - Out of bed to chair 3 times a day   - Out of bed for meals 3 times a day  - Out of bed for toileting  - Record patient progress and toleration of activity level   Outcome: Progressing

## 2023-06-21 NOTE — ASSESSMENT & PLAN NOTE
"· Continue Namenda  · She does currently at her baseline  · Patient's daughter states she has a history of \"wandering\", patient's room close to nursing station for close monitoring  "

## 2023-06-21 NOTE — ASSESSMENT & PLAN NOTE
Noted to have vomiting at SNF, patient denies this  Was nauseous in the ER, resolved after Zofran  Continue as needed Zofran  CT abdomen pelvis negative for acute pathology  Aspiration precautions

## 2023-06-21 NOTE — H&P
[address]  H&P  Name: Roosevelt Strauss 80 y o  female I MRN: [id number]  Unit/Bed#: [facility] I Date of Admission: 6/21/2023   Date of Service: 6/21/2023 I Hospital Day: 0      Assessment/Plan   * Sepsis with acute hypoxic respiratory failure [facility])  Assessment & Plan  Presents to ED with shortness of breath, hypoxia and vomiting  • SIRS met: SIRS: 2/4; tachycardia, tachypnea  • Source of infection: Possibly aspiration versus UTI  • She was recently treated for cellulitis however lower extremities do not appear cellulitic  • CT chest abdomen pelvis showing nonspecific peripheral groundglass opacities in the right upper lobe, was present previously however is now mildly increased, most likely chronic interstitial lung disease no focal consolidation, mild perivesical stranding suggestive of cystitis, no infectious interabdominal pathology  Lab Results   Component Value Date    WBC 11 85 (H) 06/21/2023    PROCALCITONI 0 14 06/21/2023    LACTICACID 2 1 ([place]) 06/21/2023       • IVF given in total of 500 mL, followed by 2 doses of 20 mg IV Lasix given in the ER  o Would continue gentle IV fluids given elevated lactic  • Cultures   o Blood cultures pending  o UA positive with reflex culture pending  o MRSA culture ordered  • IV antibiotics; Zosyn/vancomycin initially given in the ED; will continue both; follow up with cultures and deescalate as appropriate, patient has allergy to ceftin       Hypothyroidism  Assessment & Plan  On levothyroxine, continue    Atherosclerotic heart disease  Assessment & Plan  Not maintained on statin  Takes 81 mg aspirin daily  Initial troponin 9, repeat 84  First EKG without significant ischemic changes, will repeat  Initially denying chest pain, now stating she might feel some chest pain, nursing facility states she was experiencing chest pain, shortness of breath    Acute respiratory failure [facility])  Assessment & Plan  Patient with no baseline oxygen needs reports the "ER with shortness of breath and hypoxia at nursing home  Possibly in the setting of aspiration given had episode of vomiting prior  Also has some left calf pain, will get CTA PE study to rule out PE with concurrent tachycardia  Currently on 2 L in the ER  Wean oxygen as tolerated    Pain of left calf  Assessment & Plan  Pain to palpation of left calf, no erythema or significant swelling  Patient is overall poor historian    We will check lower extremity duplex to rule out DVT    Cirrhosis [facility])  Assessment & Plan  Patient has no history noted of cirrhosis in chart  On CT abdomen pelvis noted to have hepatic cirrhosis, no ascites mentioned on scan  Does have some abdominal distention, patient denies feeling distended    LFTs within normal limits  Monitor    Vomiting  Assessment & Plan  Noted to have vomiting at SNF, patient denies this  Was nauseous in the ER, resolved after Zofran  Continue as needed Zofran  CT abdomen pelvis negative for acute pathology  Aspiration precautions    Elevated troponin  Assessment & Plan  · Initial troponin 9, repeat is 84  · Likely demand in setting of sepsis and tachycardia, experiencing some nonspecific chest pain  · Recheck EKG   · Alert on telemetry  · Nitro as needed, continue aspirin per home regimen    Alzheimer's dementia ([facility] [address] )  Assessment & Plan  · Continue Namenda  · She does currently at her baseline  · Patient's daughter states she has a history of \"wandering\", patient's room close to nursing station for close monitoring    HLD (hyperlipidemia)  Assessment & Plan  Not maintained on statin given age    HTN (hypertension)  Assessment & Plan  Has noted history of hypertension in chart  Not maintained on antihypertensives  Actually hypertensive in the ER, given Lasix x2  Now is normotensive  Monitor in setting of sepsis       VTE Pharmacologic Prophylaxis:   High Risk (Score >/= 5) - Pharmacological DVT Prophylaxis Ordered: heparin  Sequential Compression Devices Ordered    Code " Status: Level 3 - DNAR and DNI   Discussion with family: Updated  (daughter) via phone  Anticipated Length of Stay: Patient will be admitted on an inpatient basis with an anticipated length of stay of greater than 2 midnights secondary to Sepsis  Total Time Spent on Date of Encounter in care of patient: 55 minutes This time was spent on one or more of the following: performing physical exam; counseling and coordination of care; obtaining or reviewing history; documenting in the medical record; reviewing/ordering tests, medications or procedures; communicating with other healthcare professionals and discussing with patient's family/caregivers  Chief Complaint: Shortness of breath    History of Present Illness:  Vielka Millard is a 80 y o  female with a PMH of Alzheimer's dementia, hypertension, hyperlipidemia who presents with shortness of breath from SNF  Patient resides at [facility]  Was found to have shortness of breath of the vomiting, was noted to be hypoxic on room air, was placed on oxygen and taken to the ER  In the ER was noted to have hypoxia of 87% on room air, placed on nasal cannula and saturating well  Imaging showing possibly enlarged right sided groundglass opacity from previous, no specific pneumonia, CT abdomen without findings for acute GI illness, possibly some cystitis  UA confirms cystitis and was reflexed to culture  Blood cultures drawn  Patient offers very little history given dementia, she denies any vomiting although there was reports of this  Denies any  shortness of breath, abdominal pain  Is not oriented to place or time  Is oriented to self  On first assessment stated no chest pain, no stating she can feel some chest pain  Review of Systems:  Review of Systems   Unable to perform ROS: Dementia (Limited due to dementia)   Respiratory: Positive for shortness of breath  Gastrointestinal: Positive for nausea and vomiting         Past Medical and Surgical "History:   No past medical history on file  No past surgical history on file  Meds/Allergies:  Prior to Admission medications    Not on File     I have reveiwed home medications using records provided by [facility]  Allergies: Allergies   Allergen Reactions   • Ceftin [Cefuroxime] Other (See Comments)     Unsure at present         Social History:  Marital Status:    Occupation: None  Patient Pre-hospital Living Situation: Skilled Nursing Facility: [place]  Patient Pre-hospital Level of Mobility: unable to be assessed at time of evaluation  Patient Pre-hospital Diet Restrictions: None  Substance Use History:   Social History     Substance and Sexual Activity   Alcohol Use Not on file     Social History     Tobacco Use   Smoking Status Not on file   Smokeless Tobacco Not on file     Social History     Substance and Sexual Activity   Drug Use Not on file       Family History:  No family history on file  Physical Exam:     Vitals:   Blood Pressure: 115/51 (06/21/23 1804)  Pulse: 87 (06/21/23 1804)  Temperature: 98 2 °F (36 8 °C) (06/21/23 1804)  Temp Source: Temporal (06/21/23 1359)  Respirations: 18 (06/21/23 1804)  Height: 5' 4\" (162 6 cm) (06/21/23 1413)  Weight - Scale: 87 4 kg (192 lb 10 9 oz) (06/21/23 1436)  SpO2: 97 % (06/21/23 1804)    Physical Exam  Vitals and nursing note reviewed  Constitutional:       General: She is not in acute distress  Appearance: Normal appearance  She is obese  She is ill-appearing  HENT:      Head: Normocephalic and atraumatic  Nose: No congestion  Mouth/Throat:      Mouth: Mucous membranes are moist    Eyes:      Conjunctiva/sclera: Conjunctivae normal    Cardiovascular:      Rate and Rhythm: Normal rate and regular rhythm  Pulses: Normal pulses  Heart sounds: Normal heart sounds  No murmur heard  Pulmonary:      Effort: Pulmonary effort is normal  No respiratory distress  Breath sounds: Normal breath sounds        Comments: Diffusely " diminished breath sounds  Abdominal:      General: Bowel sounds are normal  There is distension (soft)  Palpations: Abdomen is soft  Tenderness: There is no abdominal tenderness  Hernia: A hernia (Umbilical large, no pain when reducing) is present  Musculoskeletal:         General: Tenderness (Left calf) present  Normal range of motion  Right lower leg: Edema (Trace) present  Left lower leg: Edema (Trace) present  Skin:     General: Skin is warm and dry  Neurological:      Mental Status: She is alert and oriented to person, place, and time  Additional Data:     Lab Results:  Results from last 7 days   Lab Units 06/21/23  1416   WBC Thousand/uL 11 85*   HEMOGLOBIN g/dL 13 9   HEMATOCRIT % 44 5   PLATELETS Thousands/uL 171   NEUTROS PCT % 84*   LYMPHS PCT % 10*   MONOS PCT % 4   EOS PCT % 2     Results from last 7 days   Lab Units 06/21/23  1416   SODIUM mmol/L 138   POTASSIUM mmol/L 4 1   CHLORIDE mmol/L 99   CO2 mmol/L 31   BUN mg/dL 17   CREATININE mg/dL 0 87   ANION GAP mmol/L 8   CALCIUM mg/dL 9 6   ALBUMIN g/dL 4 0   TOTAL BILIRUBIN mg/dL 0 93   ALK PHOS U/L 87   ALT U/L 19   AST U/L 27   GLUCOSE RANDOM mg/dL 131     Results from last 7 days   Lab Units 06/21/23  1416   INR  0 94             Results from last 7 days   Lab Units 06/21/23  1622 06/21/23  1416   LACTIC ACID mmol/L 2 1* 2 2*   PROCALCITONIN ng/ml  --  0 14       Lines/Drains:  Invasive Devices     Peripheral Intravenous Line  Duration           Peripheral IV 06/21/23 Proximal;Right;Other (Comment) Antecubital <1 day                    Imaging: Reviewed radiology reports from this admission including: chest xray, chest CT scan and abdominal/pelvic CT  CT head without contrast   Final Result by Wilfredo Guzmán MD (06/21 7736)      No acute intracranial abnormality  Chronic microangiopathic changes                    Workstation performed: [id number]         CT chest abdomen pelvis wo contrast   Final Result by Lona Huertas MD (06/21 1636)      1  Nonspecific peripheral groundglass opacities, particularly in the right upper lobe, present previously though mildly increased  This may represent chronic interstitial lung disease  No focal consolidation  2   Mild perivesical stranding suggestive of cystitis  3   Hepatic cirrhosis  4   Cholelithiasis  5   Pancreatic cyst, new or increased from 2019  This is of unlikely clinical significance in a patient of this age though would be better evaluated with contrast-enhanced MRI/MRCP  Workstation performed: [id number]         XR chest portable   Final Result by Jennifer Madsen MD (06/21 1612)      Mild cardiomegaly      Mild vascular congestion      Small right pleural effusion                  Workstation performed: [id number]         CTA chest pe study    (Results Pending)   VAS lower limb venous duplex study, unilateral/limited    (Results Pending)       EKG and Other Studies Reviewed on Admission:   · EKG:Sinus tachycardia, no ischemic changes     ** Please Note: This note has been constructed using a voice recognition system   **

## 2023-06-21 NOTE — ASSESSMENT & PLAN NOTE
Patient has no history noted of cirrhosis in chart  On CT abdomen pelvis noted to have hepatic cirrhosis, no ascites mentioned on scan  Does have some abdominal distention, patient denies feeling distended    LFTs within normal limits  Monitor

## 2023-06-21 NOTE — PROGRESS NOTES
Rosette Chacon is a 80 y o  female who is currently ordered Vancomycin IV with management by the Pharmacy Consult service  Relevant clinical data and objective / subjective history reviewed  Vancomycin Assessment:  Indication and Goal AUC/Trough: Pneumonia (goal -600, trough >10); Urinary tract infection (goal -600, trough >10)  Clinical Status: New Start   Micro:     Renal Function:  SCr: 0 87 mg/dL  CrCl: 43 2 mL/min  Renal replacement: Not on dialysis  Days of Therapy: 1  Current Dose: 1000 mg IV once + 500 mg catch up once  Vancomycin Plan:  New Dosin mg IV q 24 hrs  Estimated AUC: 494 mcg*hr/mL  Estimated Trough: 15 3 mcg/mL  Next Level:  at 0600   Renal Function Monitoring: Daily BMP and [place] will continue to follow closely for s/sx of nephrotoxicity, infusion reactions and appropriateness of therapy  BMP and CBC will be ordered per protocol  We will continue to follow the patient’s culture results and clinical progress daily      Crow Thomason, Pharmacist

## 2023-06-21 NOTE — ASSESSMENT & PLAN NOTE
· Initial troponin 9, repeat is 84  · Likely demand in setting of sepsis and tachycardia, experiencing some nonspecific chest pain  · Recheck EKG   · Alert on telemetry  · Nitro as needed, continue aspirin per home regimen

## 2023-06-21 NOTE — ASSESSMENT & PLAN NOTE
Not maintained on statin  Takes 81 mg aspirin daily  Initial troponin 9, repeat 84  First EKG without significant ischemic changes, will repeat  Initially denying chest pain, now stating she might feel some chest pain, nursing facility states she was experiencing chest pain, shortness of breath

## 2023-06-21 NOTE — ASSESSMENT & PLAN NOTE
Pain to palpation of left calf, no erythema or significant swelling  Patient is overall poor historian    We will check lower extremity duplex to rule out DVT

## 2023-06-22 ENCOUNTER — APPOINTMENT (INPATIENT)
Dept: NON INVASIVE DIAGNOSTICS | Facility: HOSPITAL | Age: 88
DRG: 871 | End: 2023-06-22
Payer: COMMERCIAL

## 2023-06-22 LAB
ALBUMIN SERPL BCP-MCNC: 3.1 G/DL (ref 3.5–5)
ALP SERPL-CCNC: 54 U/L (ref 34–104)
ALT SERPL W P-5'-P-CCNC: 20 U/L (ref 7–52)
AMMONIA PLAS-SCNC: 29 UMOL/L (ref 18–72)
ANION GAP SERPL CALCULATED.3IONS-SCNC: 5 MMOL/L
AST SERPL W P-5'-P-CCNC: 25 U/L (ref 13–39)
BASOPHILS # BLD AUTO: 0.03 THOUSANDS/ÂΜL (ref 0–0.1)
BASOPHILS NFR BLD AUTO: 0 % (ref 0–1)
BILIRUB SERPL-MCNC: 1.27 MG/DL (ref 0.2–1)
BUN SERPL-MCNC: 14 MG/DL (ref 5–25)
CALCIUM ALBUM COR SERPL-MCNC: 8.9 MG/DL (ref 8.3–10.1)
CALCIUM SERPL-MCNC: 8.2 MG/DL (ref 8.4–10.2)
CARDIAC TROPONIN I PNL SERPL HS: 131 NG/L (ref 8–18)
CHLORIDE SERPL-SCNC: 102 MMOL/L (ref 96–108)
CO2 SERPL-SCNC: 30 MMOL/L (ref 21–32)
CREAT SERPL-MCNC: 0.95 MG/DL (ref 0.6–1.3)
EOSINOPHIL # BLD AUTO: 0.06 THOUSAND/ÂΜL (ref 0–0.61)
EOSINOPHIL NFR BLD AUTO: 1 % (ref 0–6)
ERYTHROCYTE [DISTWIDTH] IN BLOOD BY AUTOMATED COUNT: 14 % (ref 11.6–15.1)
FLUAV RNA RESP QL NAA+PROBE: NEGATIVE
FLUBV RNA RESP QL NAA+PROBE: NEGATIVE
GFR SERPL CREATININE-BSD FRML MDRD: 51 ML/MIN/1.73SQ M
GLUCOSE SERPL-MCNC: 106 MG/DL (ref 65–140)
HCT VFR BLD AUTO: 36.4 % (ref 34.8–46.1)
HGB BLD-MCNC: 11.7 G/DL (ref 11.5–15.4)
IMM GRANULOCYTES # BLD AUTO: 0.02 THOUSAND/UL (ref 0–0.2)
IMM GRANULOCYTES NFR BLD AUTO: 0 % (ref 0–2)
INR PPP: 1.1 (ref 0.84–1.19)
LYMPHOCYTES # BLD AUTO: 1.42 THOUSANDS/ÂΜL (ref 0.6–4.47)
LYMPHOCYTES NFR BLD AUTO: 21 % (ref 14–44)
MAGNESIUM SERPL-MCNC: 1.9 MG/DL (ref 1.9–2.7)
MCH RBC QN AUTO: 29.7 PG (ref 26.8–34.3)
MCHC RBC AUTO-ENTMCNC: 32.1 G/DL (ref 31.4–37.4)
MCV RBC AUTO: 92 FL (ref 82–98)
MONOCYTES # BLD AUTO: 0.55 THOUSAND/ÂΜL (ref 0.17–1.22)
MONOCYTES NFR BLD AUTO: 8 % (ref 4–12)
NEUTROPHILS # BLD AUTO: 4.79 THOUSANDS/ÂΜL (ref 1.85–7.62)
NEUTS SEG NFR BLD AUTO: 70 % (ref 43–75)
NRBC BLD AUTO-RTO: 0 /100 WBCS
PLATELET # BLD AUTO: 131 THOUSANDS/UL (ref 149–390)
PMV BLD AUTO: 9.2 FL (ref 8.9–12.7)
POTASSIUM SERPL-SCNC: 3.8 MMOL/L (ref 3.5–5.3)
PROCALCITONIN SERPL-MCNC: 1.52 NG/ML
PROT SERPL-MCNC: 5.9 G/DL (ref 6.4–8.4)
PROTHROMBIN TIME: 14.3 SECONDS (ref 11.6–14.5)
RBC # BLD AUTO: 3.94 MILLION/UL (ref 3.81–5.12)
RSV RNA RESP QL NAA+PROBE: NEGATIVE
SARS-COV-2 RNA RESP QL NAA+PROBE: NEGATIVE
SODIUM SERPL-SCNC: 137 MMOL/L (ref 135–147)
WBC # BLD AUTO: 6.87 THOUSAND/UL (ref 4.31–10.16)

## 2023-06-22 PROCEDURE — 93971 EXTREMITY STUDY: CPT

## 2023-06-22 PROCEDURE — 85610 PROTHROMBIN TIME: CPT

## 2023-06-22 PROCEDURE — 84484 ASSAY OF TROPONIN QUANT: CPT | Performed by: NURSE PRACTITIONER

## 2023-06-22 PROCEDURE — 93971 EXTREMITY STUDY: CPT | Performed by: SURGERY

## 2023-06-22 PROCEDURE — 85025 COMPLETE CBC W/AUTO DIFF WBC: CPT

## 2023-06-22 PROCEDURE — 83735 ASSAY OF MAGNESIUM: CPT

## 2023-06-22 PROCEDURE — 82140 ASSAY OF AMMONIA: CPT | Performed by: INTERNAL MEDICINE

## 2023-06-22 PROCEDURE — 0241U HB NFCT DS VIR RESP RNA 4 TRGT: CPT | Performed by: INTERNAL MEDICINE

## 2023-06-22 PROCEDURE — 84145 PROCALCITONIN (PCT): CPT

## 2023-06-22 PROCEDURE — 99232 SBSQ HOSP IP/OBS MODERATE 35: CPT | Performed by: INTERNAL MEDICINE

## 2023-06-22 PROCEDURE — 80053 COMPREHEN METABOLIC PANEL: CPT

## 2023-06-22 PROCEDURE — 97167 OT EVAL HIGH COMPLEX 60 MIN: CPT

## 2023-06-22 PROCEDURE — 97163 PT EVAL HIGH COMPLEX 45 MIN: CPT

## 2023-06-22 RX ORDER — VANCOMYCIN HYDROCHLORIDE 1 G/200ML
1000 INJECTION, SOLUTION INTRAVENOUS EVERY 24 HOURS
Status: DISCONTINUED | OUTPATIENT
Start: 2023-06-22 | End: 2023-06-22

## 2023-06-22 RX ADMIN — SODIUM CHLORIDE 500 ML: 0.9 INJECTION, SOLUTION INTRAVENOUS at 06:27

## 2023-06-22 RX ADMIN — HEPARIN SODIUM 5000 UNITS: 5000 INJECTION INTRAVENOUS; SUBCUTANEOUS at 21:05

## 2023-06-22 RX ADMIN — PIPERACILLIN AND TAZOBACTAM 3.38 G: 36; 4.5 INJECTION, POWDER, FOR SOLUTION INTRAVENOUS at 21:05

## 2023-06-22 RX ADMIN — PIPERACILLIN AND TAZOBACTAM 3.38 G: 36; 4.5 INJECTION, POWDER, FOR SOLUTION INTRAVENOUS at 05:03

## 2023-06-22 RX ADMIN — PIPERACILLIN AND TAZOBACTAM 3.38 G: 36; 4.5 INJECTION, POWDER, FOR SOLUTION INTRAVENOUS at 10:57

## 2023-06-22 RX ADMIN — Medication 1 TABLET: at 07:54

## 2023-06-22 RX ADMIN — MEMANTINE 10 MG: 10 TABLET ORAL at 17:22

## 2023-06-22 RX ADMIN — MEMANTINE 10 MG: 10 TABLET ORAL at 07:54

## 2023-06-22 RX ADMIN — PIPERACILLIN AND TAZOBACTAM 3.38 G: 36; 4.5 INJECTION, POWDER, FOR SOLUTION INTRAVENOUS at 17:22

## 2023-06-22 RX ADMIN — HEPARIN SODIUM 5000 UNITS: 5000 INJECTION INTRAVENOUS; SUBCUTANEOUS at 05:03

## 2023-06-22 RX ADMIN — ASPIRIN 81 MG CHEWABLE TABLET 81 MG: 81 TABLET CHEWABLE at 07:55

## 2023-06-22 RX ADMIN — SENNOSIDES 8.6 MG: 8.6 TABLET, FILM COATED ORAL at 07:56

## 2023-06-22 RX ADMIN — HEPARIN SODIUM 5000 UNITS: 5000 INJECTION INTRAVENOUS; SUBCUTANEOUS at 13:58

## 2023-06-22 RX ADMIN — POLYETHYLENE GLYCOL 3350 17 G: 17 POWDER, FOR SOLUTION ORAL at 07:53

## 2023-06-22 RX ADMIN — LEVOTHYROXINE SODIUM 125 MCG: 125 TABLET ORAL at 05:03

## 2023-06-22 RX ADMIN — CYANOCOBALAMIN TAB 500 MCG 1000 MCG: 500 TAB at 07:55

## 2023-06-22 RX ADMIN — POLYETHYLENE GLYCOL 3350 17 G: 17 POWDER, FOR SOLUTION ORAL at 17:22

## 2023-06-22 NOTE — ASSESSMENT & PLAN NOTE
Patient has no history noted of cirrhosis in chart  On CT abdomen pelvis noted to have hepatic cirrhosis, no ascites mentioned on scan  Does have some abdominal distention, patient denies feeling distended    LFTs within normal limits  Monitor    She is agitated today will check ammonia

## 2023-06-22 NOTE — ASSESSMENT & PLAN NOTE
Presents to ED with shortness of breath, hypoxia and vomiting  • SIRS met: SIRS: 2/4; tachycardia, tachypnea  • Source of infection: Possibly aspiration versus UTI  • She was recently treated for cellulitis however lower extremities do not appear cellulitic  • CT chest abdomen pelvis showing nonspecific peripheral groundglass opacities in the right upper lobe, was present previously however is now mildly increased, most likely chronic interstitial lung disease no focal consolidation, mild perivesical stranding suggestive of cystitis, no infectious interabdominal pathology  Lab Results   Component Value Date    WBC 6 87 06/22/2023    PROCALCITONI 1 52 (H) 06/22/2023    LACTICACID 2 1 ([place]) 06/21/2023       Continue with zosyn only

## 2023-06-22 NOTE — PROGRESS NOTES
Molina Chong is a 80 y o  female who is currently ordered Vancomycin IV with management by the Pharmacy Consult service  Relevant clinical data and objective / subjective history reviewed  Vancomycin Assessment:  Indication and Goal AUC/Trough: Pneumonia (goal -600, trough >10); Urinary tract infection (goal -600, trough >10)  Clinical Status: worsening  Micro:     Renal Function:  SCr: 0 95 mg/dL  CrCl: 38 7 mL/min  Renal replacement: Not on dialysis  Days of Therapy: 2  Current Dose: 1250 mg IV q 24 hours  Vancomycin Plan:  New Dosin mg IV q 24 hours  Estimated AUC: 446 mcg*hr/mL  Estimated Trough: 14 1 mcg/mL  Next Level: 23 at 0600  Renal Function Monitoring: Daily BMP and [place] will continue to follow closely for s/sx of nephrotoxicity, infusion reactions and appropriateness of therapy  BMP and CBC will be ordered per protocol  We will continue to follow the patient’s culture results and clinical progress daily      Olvin [facility], Pharmacist

## 2023-06-22 NOTE — ASSESSMENT & PLAN NOTE
Not maintained on statin  Takes 81 mg aspirin daily  Initial troponin 9, repeat 84  First EKG without significant ischemic changes, will repeat  Denies any chest pain or SOB

## 2023-06-22 NOTE — CONSULTS
Vancomycin IV Pharmacy-to-Dose Consultation     Vancomycin has been discontinued  Pharmacy will sign off  Please contact or re-consult with questions      Erendira Gunter, Pharmacist

## 2023-06-22 NOTE — ASSESSMENT & PLAN NOTE
Noted to have vomiting at SNF, patient denies this  Was nauseous in the ER, resolved after Zofran  Continue as needed Zofran  CT abdomen pelvis negative for acute pathology  Aspiration precautions- resolved

## 2023-06-22 NOTE — ASSESSMENT & PLAN NOTE
· Initial troponin 9, repeat is 84  · Likely demand in setting of sepsis and tachycardia, denies any chest pain   · Alert on telemetry  · Nitro as needed, continue aspirin per home regimen

## 2023-06-22 NOTE — PHYSICAL THERAPY NOTE
"   PHYSICAL THERAPY EVALUATION  NAME:  Sherman Martin  DATE: 06/22/23    AGE:   80 y o  Mrn:   [id number]  ADMIT DX:  Shortness of breath [R06 02]  Vomiting [R11 10]  UTI (urinary tract infection) [N39 0]  Hypoxia [R09 02]  Severe sepsis ([facility] [place] [address] ) [A41 9, R65 20]    History reviewed  No pertinent past medical history  Length Of Stay: 1  Performed at least 2 patient identifiers during session: ID bracelet  PHYSICAL THERAPY EVALUATION :        06/22/23 0455   Note Type   Note type Evaluation   Pain Assessment   Pain Assessment Tool 0-10   Pain Score No Pain   Restrictions/Precautions   Other Precautions Chair Alarm; Bed Alarm;Telemetry; Fall Risk   Home Living   Type of Home SNF   Home Layout One level;Performs ADLs on one level; Able to live on main level with bedroom/bathroom   Additional Comments Pt is a poor historian, unable to provide PLOF or home setup  Per CM note pt is resident at [facility]" at baseline   Cognition   Overall Cognitive Status Impaired   Orientation Level Oriented to place; Disoriented to time;Disoriented to situation;Disoriented to person   Following Commands Follows one step commands with increased time or repetition   RLE Assessment   RLE Assessment WFL  (3+/5 strength)   LLE Assessment   LLE Assessment WFL  (3+/5 strength)   Bed Mobility   Supine to Sit 3  Moderate assistance   Additional items Assist x 1; Increased time required;Verbal cues;HOB elevated; Bedrails  (trunk management)   Additional Comments HOB slightly elevated, bedrails  Mod A for trunk management with VC for technique  Pt incontinent of urine at start of session   Transfers   Sit to Stand 3  Moderate assistance   Additional items Assist x 1; Increased time required;Verbal cues   Stand to Sit 3  Moderate assistance   Additional items Assist x 1; Increased time required;Verbal cues   Stand pivot 3  Moderate assistance   Additional items Assist x 1; Increased time required;Verbal cues   Additional Comments Rw and mod x 1 for " force production, with VC for forward scooting and hand placement as pt attmepting to pull from RW  SPT with mod A for weight shifting and RW management  B/L knee buckling noted however able to maintain upright with mod A   Ambulation/Elevation   Gait pattern R Knee Nathanael;L Knee Nathanael; Improper Weight shift; Forward Flexion;Decreased foot clearance; Foward flexed; Short stride; Excessively slow; Shuffling   Gait Assistance 3  Moderate assist   Additional items Assist x 1;Verbal cues   Assistive Device Rolling walker   Distance 2' with RW and mod A for weight shifting, RW management and to maintain upright 2/2 B/L knee buckling  VC for RW management and sequencing  Deferred further ambulation due to safety concerns  Chair follow provided   Balance   Static Sitting Fair +   Dynamic Sitting Fair   Static Standing Poor +   Dynamic Standing Poor   Ambulatory Poor   Endurance Deficit   Endurance Deficit Yes   Endurance Deficit Description fatigue   Activity Tolerance   Activity Tolerance Patient limited by fatigue   Medical Staff Made Aware OTRobby   Nurse Made Aware RNMack   Assessment   Prognosis Fair   Problem List Decreased strength;Decreased endurance; Impaired balance;Decreased mobility; Decreased cognition; Impaired judgement;Decreased safety awareness; Obesity   Barriers to Discharge None   Barriers to Discharge Comments Pt resides in SNF   Goals   Patient Goals None stated   STG Expiration Date 07/06/23   Plan   Treatment/Interventions Functional transfer training;LE strengthening/ROM; Therapeutic exercise; Endurance training;Patient/family training;Cognitive reorientation;Equipment eval/education; Bed mobility;Gait training; Compensatory technique education;Spoke to nursing;OT   PT Frequency 2-3x/wk   Recommendation   PT Discharge Recommendation Return to facility with rehabilitation services   Equipment Recommended   (TBD by facility)   Additional Comments Should SNF be unable to provide care at pt's current functional level, consider STR   AM-PAC Basic Mobility Inpatient   Turning in Flat Bed Without Bedrails 3   Lying on Back to Sitting on Edge of Flat Bed Without Bedrails 2   Moving Bed to Chair 2   Standing Up From Chair Using Arms 2   Walk in Room 2   Climb 3-5 Stairs With Railing 1   Basic Mobility Inpatient Raw Score 12   Basic Mobility Standardized Score 32 23   Highest Level Of Mobility   -[facility] Goal 4: Move to chair/commode   JH-HL Achieved 5: Stand (1 or more minutes)   End of Consult   Patient Position at End of Consult Bedside chair;Bed/Chair alarm activated; All needs within reach   The patient's AM-PAC Basic Mobility Inpatient Short Form Raw Score is 12    A Raw score of less than or equal to 16 suggests the patient may benefit from discharge to post-acute rehabilitation services however pt resides in SNF, has assistance  Please also refer to the recommendation of the Physical Therapist for safe discharge planning  (Please find full objective findings from PT assessment regarding body systems outlined above)  Assessment: Pt is a 80 y o  female seen for PT evaluation s/p admission to [address] [place] [address] on 6/21/2023 with Sepsis with acute hypoxic respiratory failure ([facility] [place] [address] )  Order placed for PT services    Upon evaluation: Pt is presenting with impaired functional mobility due to decreased strength, decreased endurance, impaired balance, gait deviations, impaired cognition, decreased safety awareness, impaired judgment, and fall risk requiring  mod A assistance for bed mobility, transfers, and ambulation with RW   Pt's clinical presentation is currently unstable/unpredictable given the functional mobility deficits above coupled with fall risks as indicated by AM-PAC 6-Clicks: 06/70 as well as impaired balance, polypharmacy, impaired judgement, decreased safety awareness, decreased cognition, and obesity and combined with medical complications of hypertension , abnormal CBC and need for input for mobility technique/safety  Pt's PMHx and comorbidities that may affect physical performance and progress include: Dementia, HTN, obesity, limited cognition and HLD, and athersclerotic heart disease  Personal factors affecting pt at time of IE include: advanced age, past experience, inability to perform IADLs, inability to perform ADLs, inability to navigate level surfaces without external assistance, inability to ambulate household distances, inability to navigate community distances and limited insight into impairments  Pt will benefit from continued skilled PT services to address deficits as defined above and to maximize level of functional mobility to facilitate return toward PLOF and improved QOL  From PT/mobility standpoint, recommendation at time of d/c would be return to facility with post acute rehab (PAR) pending progress in order to reduce fall risk and maximize pt's functional independence and consistency with mobility in order to facilitate return to PLOF  Recommend trial with walker next 1-2 sessions, ther ex next 1-2 sessions and trial with quick move next 1-2 sessions  Goals: Pt will: Perform bed mobility tasks with Supervision to reposition in bed and prepare for transfers  Pt will perform transfers with Supervision to increase Indep in home environment and prepare for ambulation  Pt will ambulate with LRAD for >/= 48' with  Supervision  to improve gait quality and promote proper use of assistive device and to access home environment  Increase bilateral LE strength 1/2 grade to facilitate independent mobility and Increase all balance 1/2 grade to decrease risk for falls          Marly Calzada, PT,DPT

## 2023-06-22 NOTE — PLAN OF CARE
Problem: MOBILITY - ADULT  Goal: Maintain or return to baseline ADL function  Description: INTERVENTIONS:  -  Assess patient's ability to carry out ADLs; assess patient's baseline for ADL function and identify physical deficits which impact ability to perform ADLs (bathing, care of mouth/teeth, toileting, grooming, dressing, etc )  - Assess/evaluate cause of self-care deficits   - Assess range of motion  - Assess patient's mobility; develop plan if impaired  - Assess patient's need for assistive devices and provide as appropriate  - Encourage maximum independence but intervene and supervise when necessary  - Involve family in performance of ADLs  - Assess for home care needs following discharge   - Consider OT consult to assist with ADL evaluation and planning for discharge  - Provide patient education as appropriate  Outcome: Progressing  Goal: Maintains/Returns to pre admission functional level  Description: INTERVENTIONS:  - Perform BMAT or MOVE assessment daily    - Set and communicate daily mobility goal to care team and patient/family/caregiver  - Collaborate with rehabilitation services on mobility goals if consulted  - Perform Range of Motion 3 times a day  - Reposition patient every 2 hours    - Dangle patient 3 times a day  - Stand patient 3 times a day  - Ambulate patient 3 times a day  - Out of bed to chair 3 times a day   - Out of bed for meals 3 times a day  - Out of bed for toileting  - Record patient progress and toleration of activity level   Outcome: Progressing     Problem: Potential for Falls  Goal: Patient will remain free of falls  Description: INTERVENTIONS:  - Educate patient/family on patient safety including physical limitations  - Instruct patient to call for assistance with activity   - Consult OT/PT to assist with strengthening/mobility   - Keep Call bell within reach  - Keep bed low and locked with side rails adjusted as appropriate  - Keep care items and personal belongings within reach  - Initiate and maintain comfort rounds  - Make Fall Risk Sign visible to staff  - Offer Toileting every 2 Hours, in advance of need  - Initiate/Maintain bed/chair alarm  - Obtain necessary fall risk management equipment: bed/chair alarm  - Apply yellow socks and bracelet for high fall risk patients  - Consider moving patient to room near nurses station  Outcome: Progressing     Problem: INFECTION - ADULT  Goal: Absence or prevention of progression during hospitalization  Description: INTERVENTIONS:  - Assess and monitor for signs and symptoms of infection  - Monitor lab/diagnostic results  - Monitor all insertion sites, i e  indwelling lines, tubes, and drains  - Monitor endotracheal if appropriate and nasal secretions for changes in amount and color  - [place] appropriate cooling/warming therapies per order  - Administer medications as ordered  - Instruct and encourage patient and family to use good hand hygiene technique  - Identify and instruct in appropriate isolation precautions for identified infection/condition  Outcome: Progressing  Goal: Absence of fever/infection during neutropenic period  Description: INTERVENTIONS:  - Monitor WBC    Outcome: Progressing

## 2023-06-22 NOTE — UTILIZATION REVIEW
Initial Clinical Review    Admission: Date/Time/Statement:   Admission Orders (From admission, onward)     Ordered        06/21/23 1649  INPATIENT ADMISSION  Once                      Orders Placed This Encounter   Procedures   • INPATIENT ADMISSION     Standing Status:   Standing     Number of Occurrences:   1     Order Specific Question:   Level of Care     Answer:   Med Surg [16]     Order Specific Question:   Estimated length of stay     Answer:   More than 2 Midnights     Order Specific Question:   Certification     Answer:   I certify that inpatient services are medically necessary for this patient for a duration of greater than two midnights  See H&P and MD Progress Notes for additional information about the patient's course of treatment  ED Arrival Information     Expected   6/21/2023 13:55    Arrival   6/21/2023 13:55    Acuity   Emergent            Means of arrival   Ambulance    Escorted by   [id number]   Hospitalist    Admission type   Emergency            Arrival complaint   evaluation           Chief Complaint   Patient presents with   • Shortness of Breath     Sob and chest pain and 1 episode of vomiting while at SNF, pre-hospital treatment of Nitro and O2 4L via NC         Initial Presentation: 80 y o  female to ED via EMS from NH  Present to ED with shortness of breath     PMHX Alzheimer's dementia, hypertension, hyperlipidemia   Admitted to [facility] with DX: Sepsis with acute hypoxic respiratory failure   on exam: Tachy; tachpnea; hypertensive; lungs diminished with bibasilar crackles - hypoxia of 87% on room air, placed on O2 @ 4L via nc sat 98%; UA confirms cystitis; c/o LE calf pain   Imaging showing possibly enlarged right sided groundglass opacity from previous, no specific pneumonia   PLAN: Cont iv abx; cont ivf; monitor labs; Cardiopulmonary monitoring; f/u blood / urine cx; trend trops; titrate O2; f/u LE duplex; f/u CT chest r/o PE      Date: 6/22/23    Day 2  Hypotensive; very agitated today; CT - no acute pulmonary emboli; Procal 1 52  Plan: Cont iv abx; cont ivf; monitor labs; Cardiopulmonary monitoring; f/u blood / urine cx; trend trops; titrate O2; f/u LE duplex; f/u covid / viral studies; f/u ammonia level    Date: 6/23/23     Day 3: Has surpassed a 2nd midnight with active treatments and services   Continues with confusion; hypotensive; cont iv abx      ED Triage Vitals   Temperature Pulse Respirations Blood Pressure SpO2   06/21/23 1359 06/21/23 1359 06/21/23 1359 06/21/23 1401 06/21/23 1359   98 4 °F (36 9 °C) (!) 129 20 (!) 245/110 93 %      Temp Source Heart Rate Source Patient Position - Orthostatic VS BP Location FiO2 (%)   06/21/23 1359 06/21/23 1359 06/21/23 1359 06/21/23 1359 --   Temporal Monitor Sitting Right arm       Pain Score       06/21/23 1359       No Pain          Wt Readings from Last 1 Encounters:   06/21/23 83 5 kg (184 lb)     Additional Vital Signs:   Date/Time Temp Pulse Resp BP MAP (mmHg) SpO2 O2 Device Patient Position - Orthostatic VS   06/23/23 07:22:16 97 7 °F (36 5 °C) 85 -- 112/52 72 93 % -- --   06/22/23 23:25:53 97 7 °F (36 5 °C) 76 16 123/62 82 93 % -- --   06/22/23 2000 -- -- -- -- -- 93 % None (Room air) --   06/22/23 1900 97 9 °F (36 6 °C) 78 18 123/63 83 94 % None (Room air) Sitting   06/22/23 15:02:52 97 9 °F (36 6 °C) 78 20 128/61 83 96 % -- --   06/22/23 10:48:08 98 1 °F (36 7 °C) 76 18 101/44 Abnormal  63 Abnormal  94 % -- Lying       Date/Time Temp Pulse Resp BP MAP (mmHg) SpO2 O2 Device Patient Position - Orthostatic VS   06/22/23 07:14:44 97 9 °F (36 6 °C) 88 16 96/49 Abnormal  65 85 % Abnormal  -- --   06/22/23 05:27:06 97 9 °F (36 6 °C) 84 -- 92/49 Abnormal  63 Abnormal  93 % -- --   06/21/23 23:43:34 97 5 °F (36 4 °C) 78 18 106/58 74 99 % -- --   06/21/23 1945 -- -- -- -- -- 91 % None (Room air) --   06/21/23 19:11:42 98 1 °F (36 7 °C) 88 -- 107/51 70 93 % None (Room air) --   06/21/23 1836 -- -- -- -- -- 96 % None (Room air) -- 06/21/23 18:04:50 98 2 °F (36 8 °C) 87 18 115/51 72 97 % -- --   06/21/23 1715 -- 101 20 125/63 -- 97 % Nasal cannula Lying   06/21/23 1645 -- 100 22 121/59 -- 97 % Nasal cannula Lying   06/21/23 1547 -- 106 Abnormal  22 162/69 -- 98 % Nasal cannula Sitting   06/21/23 1530 -- 104 20 -- -- 98 % Nasal cannula --   06/21/23 1501 -- 109 Abnormal  20 178/74 Abnormal  -- 97 % Nasal cannula Sitting   06/21/23 1500 -- -- -- -- -- 98 % Nasal cannula --   06/21/23 1455 -- -- -- -- -- 87 % Abnormal  None (Room air)  --   06/21/23 1419 -- -- -- -- -- -- None (Room air) --   06/21/23 1413 -- 109 Abnormal  18 183/88 Abnormal  158 -- -- --   06/21/23 1401 -- -- -- 245/110 Abnormal  -- -- -- --   06/21/23 1359 98 4 °F (36 9 °C) 129 Abnormal  20 -- -- 93 % None (Room air) Sitting           EKG: Previous ECG:     Previous ECG:  Unavailable   Rate:     ECG rate assessment: tachycardic     Rhythm:     Rhythm: sinus bradycardia and sinus tachycardia     ST segments:     ST segments:  Normal    Pertinent Labs/Diagnostic Test Results:   VAS lower limb venous duplex study, unilateral/limited   Final Result by Unique Nava DO (06/22 1459)      CTA chest pe study   Final Result by Clemencia Parra DO (06/21 2234)      No acute pulmonary embolus  Few scattered groundglass and reticular opacities in the mid to lower lung fields are nonspecific  No focal consolidation or suspicious mass  This may be secondary to mild air trapping or scattered subsegmental atelectasis/scarring  Atypical/viral    infection not excluded  Underlying interstitial lung disease not excluded  Workstation performed: [id number]         CT head without contrast   Final Result by Mg Spear MD (06/21 3621)      No acute intracranial abnormality  Chronic microangiopathic changes  Workstation performed: [id number]         CT chest abdomen pelvis wo contrast   Final Result by Mg Spear MD (06/21 0455)      1    Nonspecific peripheral groundglass opacities, particularly in the right upper lobe, present previously though mildly increased  This may represent chronic interstitial lung disease  No focal consolidation  2   Mild perivesical stranding suggestive of cystitis  3   Hepatic cirrhosis  4   Cholelithiasis  5   Pancreatic cyst, new or increased from 2019  This is of unlikely clinical significance in a patient of this age though would be better evaluated with contrast-enhanced MRI/MRCP                          Workstation performed: [id number]         XR chest portable   Final Result by Marilin Escoto MD (06/21 1612)      Mild cardiomegaly      Mild vascular congestion      Small right pleural effusion                  Workstation performed: [id number]           Results from last 7 days   Lab Units 06/22/23  1310   SARS-COV-2  Negative     Results from last 7 days   Lab Units 06/22/23  0536 06/21/23  1416   WBC Thousand/uL 6 87 11 85*   HEMOGLOBIN g/dL 11 7 13 9   HEMATOCRIT % 36 4 44 5   PLATELETS Thousands/uL 131* 171   NEUTROS ABS Thousands/µL 4 79 10 01*         Results from last 7 days   Lab Units 06/22/23  0536 06/21/23  1416   SODIUM mmol/L 137 138   POTASSIUM mmol/L 3 8 4 1   CHLORIDE mmol/L 102 99   CO2 mmol/L 30 31   ANION GAP mmol/L 5 8   BUN mg/dL 14 17   CREATININE mg/dL 0 95 0 87   EGFR ml/min/1 73sq m 51 57   CALCIUM mg/dL 8 2* 9 6   MAGNESIUM mg/dL 1 9 1 9     Results from last 7 days   Lab Units 06/22/23  1245 06/22/23  0536 06/21/23  1416   AST U/L  --  25 27   ALT U/L  --  20 19   ALK PHOS U/L  --  54 87   TOTAL PROTEIN g/dL  --  5 9* 8 1   ALBUMIN g/dL  --  3 1* 4 0   TOTAL BILIRUBIN mg/dL  --  1 27* 0 93   AMMONIA umol/L 29  --   --          Results from last 7 days   Lab Units 06/22/23  0536 06/21/23  1416   GLUCOSE RANDOM mg/dL 106 131       Results from last 7 days   Lab Units 06/21/23  1416   PH OK  7 307   PCO2 OK mm Hg 59 7*   PO2 OK mm Hg 31 6*   HCO3 OK mmol/L 29 2   BASE EXC OK mmol/L 1 3   O2 CONTENT OK ml/dL 11 3   O2 HGB, VENOUS % 54 3*       Results from last 7 days   Lab Units 06/21/23  1844 06/21/23  1622 06/21/23  1416   HS TNI 0HR ng/L  --   --  9   HS TNI 2HR ng/L  --  84*  --    HSTNI D2 ng/L  --  75*  --    HS TNI 4HR ng/L 159*  --   --    HSTNI D4 ng/L 150*  --   --          Results from last 7 days   Lab Units 06/22/23  0536 06/21/23  1416   PROTIME seconds 14 3 12 6   INR  1 10 0 94   PTT seconds  --  25         Results from last 7 days   Lab Units 06/22/23  0536 06/21/23  1416   PROCALCITONIN ng/ml 1 52* 0 14     Results from last 7 days   Lab Units 06/21/23  2155 06/21/23  1844 06/21/23  1622 06/21/23  1416   LACTIC ACID mmol/L 2 1* 2 1* 2 1* 2 2*       Results from last 7 days   Lab Units 06/21/23  1416   BNP pg/mL 66       Results from last 7 days   Lab Units 06/21/23  1434   CLARITY UA  Slightly Cloudy   COLOR UA  Straw   SPEC GRAV UA  1 025   PH UA  6 0   GLUCOSE UA mg/dl Negative   KETONES UA mg/dl Negative   BLOOD UA  Small*   PROTEIN UA mg/dl 30 (1+)*   NITRITE UA  Negative   BILIRUBIN UA  Negative   UROBILINOGEN UA E U /dl 0 2   LEUKOCYTES UA  Small*   WBC UA /hpf 30-50*   RBC UA /hpf 2-4   BACTERIA UA /hpf Occasional   EPITHELIAL CELLS WET PREP /hpf Occasional       Results from last 7 days   Lab Units 06/21/23  1439 06/21/23  1434 06/21/23  1416   BLOOD CULTURE  No Growth at 24 hrs   --  No Growth at 24 hrs     URINE CULTURE   --  >100,000 cfu/ml Proteus mirabilis*  --      ED Treatment:   Medication Administration from 06/21/2023 1355 to 06/21/2023 1758       Date/Time Order Dose Route Action     06/21/2023 1436 EDT ondansetron (ZOFRAN) injection 4 mg 4 mg Intravenous Given     06/21/2023 1435 EDT sodium chloride 0 9 % bolus 500 mL 500 mL Intravenous New Bag     06/21/2023 1439 EDT piperacillin-tazobactam (ZOSYN) 3 375 g in sodium chloride 0 9 % 100 mL IVPB 3 375 g Intravenous New Bag     06/21/2023 1528 EDT vancomycin (VANCOCIN) IVPB (premix in dextrose) 1,000 mg 200 mL 1,000 mg Intravenous New Bag     06/21/2023 1528 EDT furosemide (LASIX) injection 20 mg 20 mg Intravenous Given     06/21/2023 1645 EDT furosemide (LASIX) injection 20 mg 20 mg Intravenous Given        Admitting Diagnosis: Shortness of breath [R06 02]  Vomiting [R11 10]  UTI (urinary tract infection) [N39 0]  Hypoxia [R09 02]  Severe sepsis (HCC) [A41 9, R65 20]     Age/Sex: 80 y o  female     Admission Orders:  SCDs; I/O; Daily wts; Cardiopulmonary monitoring; cardiac diet    Scheduled Medications:  aspirin, 81 mg, Oral, Daily  vitamin B-12, 1,000 mcg, Oral, Daily  heparin (porcine), 5,000 Units, Subcutaneous, Q8H JENS  levothyroxine, 125 mcg, Oral, Early Morning  memantine, 10 mg, Oral, BID  multivitamin-minerals, 1 tablet, Oral, Daily  piperacillin-tazobactam, 3 375 g, Intravenous, Q6H  polyethylene glycol, 17 g, Oral, BID  senna, 1 tablet, Oral, Daily  vancomycin, 1,000 mg, Intravenous, Q24H      Continuous IV Infusions:  multi-electrolyte (PLASMALYTE-A/ISOLYTE-S PH 7 4) IV solution Rate: 75 mL/hr       PRN Meds:  acetaminophen, 650 mg, Oral, Q6H PRN  aluminum-magnesium hydroxide-simethicone, 30 mL, Oral, Q6H PRN  ondansetron, 4 mg, Intravenous, Q6H PRN        IP CONSULT TO PHARMACY    Network Utilization Review Department  ATTENTION: Please call with any questions or concerns to [phone] and carefully listen to the prompts so that you are directed to the right person  All voicemails are confidential   Giancarlo Iyer all requests for admission clinical reviews, approved or denied determinations and any other requests to dedicated fax number below belonging to the campus where the patient is receiving treatment   List of dedicated fax numbers for the Facilities:  [address] [place] [address] DENIALS (Administrative/Medical Necessity) [phone]   [address] [place] [address] (Maternity/NICU/Pediatrics) María Girard [address] [place] [address] [place] [phone] [address] [place] [address] [facility] [address] [facility] [address] [phone]

## 2023-06-22 NOTE — PLAN OF CARE
Problem: PHYSICAL THERAPY ADULT  Goal: Performs mobility at highest level of function for planned discharge setting  See evaluation for individualized goals  Description: Treatment/Interventions: Functional transfer training, LE strengthening/ROM, Therapeutic exercise, Endurance training, Patient/family training, Cognitive reorientation, Equipment eval/education, Bed mobility, Gait training, Compensatory technique education, Spoke to nursing, OT  Equipment Recommended:  (TBD by facility)       See flowsheet documentation for full assessment, interventions and recommendations  Note: Prognosis: Fair  Problem List: Decreased strength, Decreased endurance, Impaired balance, Decreased mobility, Decreased cognition, Impaired judgement, Decreased safety awareness, Obesity  Assessment: Pt is a 80 y o  female seen for PT evaluation s/p admission to [address] [place] [address] on 6/21/2023 with Sepsis with acute hypoxic respiratory failure ([facility] [address] )  Order placed for PT services  Upon evaluation: Pt is presenting with impaired functional mobility due to decreased strength, decreased endurance, impaired balance, gait deviations, impaired cognition, decreased safety awareness, impaired judgment, and fall risk requiring  mod A assistance for bed mobility, transfers, and ambulation with RW   Pt's clinical presentation is currently unstable/unpredictable given the functional mobility deficits above coupled with fall risks as indicated by AM-PAC 6-Clicks: 26/10 as well as impaired balance, polypharmacy, impaired judgement, decreased safety awareness, decreased cognition, and obesity and combined with medical complications of hypertension , abnormal CBC and need for input for mobility technique/safety  Pt's PMHx and comorbidities that may affect physical performance and progress include: Dementia, HTN, obesity, limited cognition and HLD, and athersclerotic heart disease   Personal factors affecting pt at time of IE include: advanced age, past experience, inability to perform IADLs, inability to perform ADLs, inability to navigate level surfaces without external assistance, inability to ambulate household distances, inability to navigate community distances and limited insight into impairments  Pt will benefit from continued skilled PT services to address deficits as defined above and to maximize level of functional mobility to facilitate return toward PLOF and improved QOL  From PT/mobility standpoint, recommendation at time of d/c would be return to facility with post acute rehab (PAR) pending progress in order to reduce fall risk and maximize pt's functional independence and consistency with mobility in order to facilitate return to PLOF  Recommend trial with walker next 1-2 sessions, ther ex next 1-2 sessions and trial with quick move next 1-2 sessions  Barriers to Discharge: None  Barriers to Discharge Comments: Pt resides in SNF  PT Discharge Recommendation: Return to facility with rehabilitation services    See flowsheet documentation for full assessment

## 2023-06-22 NOTE — PROGRESS NOTES
"[address]  Progress Note  Name: Ralph Villeda  MRN: [id number]  Unit/Bed#: -01 I Date of Admission: 6/21/2023   Date of Service: 6/22/2023 I Hospital Day: 1    Assessment/Plan   Hypothyroidism  Assessment & Plan  On levothyroxine, continue    Atherosclerotic heart disease  Assessment & Plan  Not maintained on statin  Takes 81 mg aspirin daily  Initial troponin 9, repeat 84  First EKG without significant ischemic changes, will repeat  Denies any chest pain or SOB    Acute respiratory failure ([address] )  Assessment & Plan  Patient with no baseline oxygen needs reports the ER with shortness of breath and hypoxia at nursing home  Possibly in the setting of aspiration given had episode of vomiting prior  CT -   \"   No acute pulmonary embolus      Few scattered groundglass and reticular opacities in the mid to lower lung fields are nonspecific  No focal consolidation or suspicious mass  This may be secondary to mild air trapping or scattered subsegmental atelectasis/scarring  Atypical/viral   infection not excluded  Underlying interstitial lung disease not excluded  \"    Will check COVID /viral            Pain of left calf  Assessment & Plan  Pain to palpation of left calf, no erythema or significant swelling  Patient is overall poor historian    We will check lower extremity duplex to rule out DVT    Cirrhosis [facility])  Assessment & Plan  Patient has no history noted of cirrhosis in chart  On CT abdomen pelvis noted to have hepatic cirrhosis, no ascites mentioned on scan  Does have some abdominal distention, patient denies feeling distended    LFTs within normal limits  Monitor    She is agitated today will check ammonia      Vomiting  Assessment & Plan  Noted to have vomiting at SNF, patient denies this  Was nauseous in the ER, resolved after Zofran  Continue as needed Zofran  CT abdomen pelvis negative for acute pathology  Aspiration precautions- resolved      Elevated troponin  Assessment & " "Plan  · Initial troponin 9, repeat is 84  · Likely demand in setting of sepsis and tachycardia, denies any chest pain   · Alert on telemetry  · Nitro as needed, continue aspirin per home regimen    Alzheimer's dementia ([facility] [address] )  Assessment & Plan  · Continue Namenda  · She does currently at her baseline  · Patient's daughter states she has a history of \"wandering\", patient's room close to nursing station for close monitoring    HLD (hyperlipidemia)  Assessment & Plan  Not maintained on statin given age    HTN (hypertension)  Assessment & Plan  BP is 101/44  Will monitor closely  * Sepsis with acute hypoxic respiratory failure (HCC)  Assessment & Plan  Presents to ED with shortness of breath, hypoxia and vomiting  • SIRS met: SIRS: 2/4; tachycardia, tachypnea  • Source of infection: Possibly aspiration versus UTI  • She was recently treated for cellulitis however lower extremities do not appear cellulitic  • CT chest abdomen pelvis showing nonspecific peripheral groundglass opacities in the right upper lobe, was present previously however is now mildly increased, most likely chronic interstitial lung disease no focal consolidation, mild perivesical stranding suggestive of cystitis, no infectious interabdominal pathology  Lab Results   Component Value Date    WBC 6 87 06/22/2023    PROCALCITONI 1 52 (H) 06/22/2023    LACTICACID 2 1 ([place]) 06/21/2023       Continue with zosyn only               VTE Pharmacologic Prophylaxis:   Moderate Risk (Score 3-4) - Pharmacological DVT Prophylaxis Ordered: heparin  Patient Centered Rounds: I performed bedside rounds with nursing staff today  Discussions with Specialists or Other Care Team Provider: Discussed with nursing  Education and Discussions with Family / Patient: called criseldather -   updated her       Total Time Spent on Date of Encounter in care of patient: 30 min  This time was spent on one or more of the following: performing physical exam; counseling and " "coordination of care; obtaining or reviewing history; documenting in the medical record; reviewing/ordering tests, medications or procedures; communicating with other healthcare professionals and discussing with patient's family/caregivers  Current Length of Stay: 1 day(s)  Current Patient Status: Inpatient   Certification Statement: The patient will continue to require additional inpatient hospital stay due to confusion   Discharge Plan: Anticipate discharge in 48-72 hrs to home  Code Status: Level 3 - DNAR and DNI    Subjective:   Patient seen and examined  Feeling \"okay \"  Says she has pain in her \"privates\"    Objective:     Vitals:   Temp (24hrs), Av °F (36 7 °C), Min:97 5 °F (36 4 °C), Max:98 4 °F (36 9 °C)    Temp:  [97 5 °F (36 4 °C)-98 4 °F (36 9 °C)] 98 1 °F (36 7 °C)  HR:  [] 76  Resp:  [16-22] 18  BP: ()/() 101/44  SpO2:  [85 %-99 %] 94 %  Body mass index is 31 58 kg/m²  Input and Output Summary (last 24 hours): Intake/Output Summary (Last 24 hours) at 2023 1224  Last data filed at 2023 0840  Gross per 24 hour   Intake 1660 ml   Output --   Net 1660 ml       Physical Exam:   Physical Exam  Constitutional:       General: She is not in acute distress  Appearance: She is not ill-appearing, toxic-appearing or diaphoretic  Eyes:      Pupils: Pupils are equal, round, and reactive to light  Cardiovascular:      Rate and Rhythm: Normal rate  Heart sounds: No murmur heard  No friction rub  No gallop  Pulmonary:      Effort: No respiratory distress  Breath sounds: No stridor  No wheezing, rhonchi or rales  Chest:      Chest wall: No tenderness  Abdominal:      General: Abdomen is flat  There is no distension  Palpations: There is no mass  Tenderness: There is no abdominal tenderness  There is no right CVA tenderness, left CVA tenderness or guarding  Skin:     Capillary Refill: Capillary refill takes less than 2 seconds        " Coloration: Skin is not jaundiced or pale  Findings: No bruising, erythema, lesion or rash  Neurological:      General: No focal deficit present  Cranial Nerves: No cranial nerve deficit  Sensory: No sensory deficit  Motor: No weakness        Coordination: Coordination normal       Gait: Gait normal       Deep Tendon Reflexes: Reflexes normal    Psychiatric:         Mood and Affect: Mood normal           Additional Data:     Labs:  Results from last 7 days   Lab Units 06/22/23  0536   WBC Thousand/uL 6 87   HEMOGLOBIN g/dL 11 7   HEMATOCRIT % 36 4   PLATELETS Thousands/uL 131*   NEUTROS PCT % 70   LYMPHS PCT % 21   MONOS PCT % 8   EOS PCT % 1     Results from last 7 days   Lab Units 06/22/23  0536   SODIUM mmol/L 137   POTASSIUM mmol/L 3 8   CHLORIDE mmol/L 102   CO2 mmol/L 30   BUN mg/dL 14   CREATININE mg/dL 0 95   ANION GAP mmol/L 5   CALCIUM mg/dL 8 2*   ALBUMIN g/dL 3 1*   TOTAL BILIRUBIN mg/dL 1 27*   ALK PHOS U/L 54   ALT U/L 20   AST U/L 25   GLUCOSE RANDOM mg/dL 106     Results from last 7 days   Lab Units 06/22/23  0536   INR  1 10             Results from last 7 days   Lab Units 06/22/23  0536 06/21/23  2155 06/21/23  1844 06/21/23  1622 06/21/23  1416   LACTIC ACID mmol/L  --  2 1* 2 1* 2 1* 2 2*   PROCALCITONIN ng/ml 1 52*  --   --   --  0 14       Lines/Drains:  Invasive Devices     Peripheral Intravenous Line  Duration           Peripheral IV 06/21/23 Proximal;Right;Other (Comment) Antecubital <1 day                  Telemetry:  Telemetry Orders (From admission, onward)             24 Hour Telemetry Monitoring  Continuous x 24 Hours ([place])        Expiring   Question:  Reason for 24 Hour Telemetry  Answer:  PCI/EP study (including pacer and ICD implementation), Cardiac surgery, MI, abnormal cardiac cath, and chest pain- rule out MI                 Telemetry Reviewed: not on telemetry   Indication for Continued Telemetry Use: Acute MI/Unstable Angina/Rule out ACS Imaging: No pertinent imaging reviewed  Recent Cultures (last 7 days):   Results from last 7 days   Lab Units 06/21/23  1439 06/21/23  1416   BLOOD CULTURE  Received in Microbiology Lab  Culture in Progress  Received in Microbiology Lab  Culture in Progress  Last 24 Hours Medication List:   Current Facility-Administered Medications   Medication Dose Route Frequency Provider Last Rate   • acetaminophen  650 mg Oral Q6H PRN Jina Irene PA-C     • aluminum-magnesium hydroxide-simethicone  30 mL Oral Q6H PRN Jina Irene PA-C     • aspirin  81 mg Oral Daily Jina Irene PA-C     • vitamin B-12  1,000 mcg Oral Daily Jina Irene PA-C     • heparin (porcine)  5,000 Units Subcutaneous [facility] Jina Irene PA-C     • levothyroxine  125 mcg Oral Early Morning Jina Irene PA-C     • memantine  10 mg Oral BID Jina Irene PA-C     • multivitamin-minerals  1 tablet Oral Daily Jina Irene PA-C     • ondansetron  4 mg Intravenous Q6H PRN Jina Irene PA-C     • piperacillin-tazobactam  3 375 g Intravenous Q6H Jina Irene PA-C 3 375 g (06/22/23 1057)   • polyethylene glycol  17 g Oral BID Jina Irene PA-C     • senna  1 tablet Oral Daily Jina Irene PA-C          Today, Patient Was Seen By: Shakira Harrington MD    **Please Note: This note may have been constructed using a voice recognition system  **

## 2023-06-22 NOTE — ASSESSMENT & PLAN NOTE
"Patient with no baseline oxygen needs reports the ER with shortness of breath and hypoxia at nursing home  Possibly in the setting of aspiration given had episode of vomiting prior  CT -   \"   No acute pulmonary embolus      Few scattered groundglass and reticular opacities in the mid to lower lung fields are nonspecific  No focal consolidation or suspicious mass  This may be secondary to mild air trapping or scattered subsegmental atelectasis/scarring  Atypical/viral   infection not excluded  Underlying interstitial lung disease not excluded  \"    Will check ARUN Ram          "

## 2023-06-22 NOTE — OCCUPATIONAL THERAPY NOTE
"    Occupational Therapy Evaluation     Patient Name: Jen Corbett  NTYBU'V Date: 6/22/2023  Problem List  Principal Problem:    Sepsis with acute hypoxic respiratory failure ([facility] [address] )  Active Problems:    HTN (hypertension)    HLD (hyperlipidemia)    Alzheimer's dementia (HCC)    Elevated troponin    Vomiting    Cirrhosis (HCC)    Pain of left calf    Acute respiratory failure ([facility] [address] )    Atherosclerotic heart disease    Hypothyroidism    Past Medical History  History reviewed  No pertinent past medical history  Past Surgical History  History reviewed  No pertinent surgical history  06/22/23 0728   Note Type   Note type Evaluation   Pain Assessment   Pain Assessment Tool 0-10   Pain Score No Pain   Restrictions/Precautions   Other Precautions Chair Alarm; Bed Alarm; Fall Risk;Cognitive   Home Living   Type of Home SNF   Home Layout One level;Performs ADLs on one level; Able to live on main level with bedroom/bathroom   Additional Comments Pt is a poor historian, unable to provide PLOF or room set-up  Per H&P, Pt \"wanders\" at facility  Prior Function   Level of [place] Needs assistance with ADLs; Needs assistance with functional mobility; Needs assistance with [address] [place] [address] staff   IADLs Family/Friend/Other provides transportation; Family/Friend/Other provides meals; Family/Friend/Other provides medication management   ADL   Where Assessed Edge of bed   Grooming Assistance 5  Supervision/Setup   Grooming Deficit Setup   UB Dressing Assistance 5  Supervision/Setup   UB Dressing Deficit Setup   LB Dressing Assistance 2  Maximal Assistance   LB Dressing Deficit Steadying; Requires assistive device for steadying;Verbal cueing;Supervision/safety; Increased time to complete; Don/doff R sock; Don/doff L sock; Thread RLE into pants; Thread LLE into pants;Pull up over hips   Additional Comments Pt completing ADL tasks while seated at EOB   UB Dressing and grooming tasks @ S after set-up with increased time and vc'ing " for sequencing/initiation  LB Dressing @ Max A for donning socks/pants around feet and CM around waist while standing with UB supported from RW   Bed Mobility   Supine to Sit 3  Moderate assistance   Additional items Assist x 1; Increased time required;Verbal cues  (trunk management)   Additional Comments HOB slightly elevated, use of bedrails PRN  Pt maintaining sitting unsupported at EOB with F+ balance   Transfers   Sit to Stand 3  Moderate assistance   Additional items Assist x 1; Increased time required;Verbal cues   Stand to Sit 3  Moderate assistance   Additional items Assist x 1; Increased time required;Verbal cues   Stand pivot 3  Moderate assistance   Additional items Assist x 1; Increased time required;Verbal cues   Additional Comments Pt completing functional transfers with use of RW for UB support  Mod A for STS and SPT with increased time, vc'ing for safety, weight shifitng, RW management and postural control  Balance   Static Sitting Fair +   Dynamic Sitting Fair   Static Standing Poor +   Dynamic Standing Poor   Activity Tolerance   Activity Tolerance Patient limited by fatigue   Medical Staff Made Aware Spoke with PT, Corrina   Nurse Made Aware Spoke with RNMiranda Assessment   RUE Assessment WFL   LUE Assessment   LUE Assessment WFL   Hand Function   Gross Motor Coordination Functional   Fine Motor Coordination Functional   Sensation   Light Touch No apparent deficits   Cognition   Overall Cognitive Status Impaired   Arousal/Participation Alert; Responsive; Cooperative   Attention Difficulty attending to directions   Orientation Level Oriented to place; Disoriented to time;Disoriented to situation;Disoriented to person   Memory Decreased long term memory   Following Commands Follows one step commands with increased time or repetition   Assessment   Limitation Decreased ADL status; Decreased UE strength;Decreased Safe judgement during ADL;Decreased cognition;Decreased endurance;Decreased self-care trans;Decreased high-level ADLs   Prognosis Good   Assessment Pt is a 80 y o  female, admitted to [address] [place] [address] 6/21/2023 d/t experiencing SOB and vomiting  Dx: sepsis with acute hypoxic respiratory faliure  Pt with PMHx impacting their performance during ADL tasks, including: alzheimer's dementia, HTN, hyperlipidemia,   Prior to admission to the hospital Pt was performing ADLs with physical assistance  IADLs with physical assistance  Functional transfers/ambulation with physical assistance  Cognitive status was PTA was impaired  OT order placed to assess Pt's ADLs, cognitive status, and performance during functional tasks in order to maximize safety and independence while making most appropriate d/c recommendations  Pt's clinical presentation is currently unstable/unpredictable given new onset deficits that effect Pt's occupational performance and ability to safely return to OF including decrease activity tolerance, decrease standing balance, decrease performance during ADL tasks, decrease cognition, decrease safety awareness , decrease UB MS, decrease generalized strength, decrease activity engagement, decrease performance during functional transfers, high fall risk and limited insight to deficits combined with medical complications of hypotension, low SpO2 values, new onset O2 use, decreased skin integrity, incontinence, fear/retreat and need for input for mobility technique/safety  Pt was on 2L in ED although has since been weened to RA and maintaining >93% with no SOB noted  Personal factors affecting Pt at time of initial evaluation include: advanced age, past experience, behavioral pattern, inability to perform current job functions, inability to perform IADLs, inability to perform ADLs, inability to ambulate household distances, inability to navigate community distances, limited insight into impairments and decreased initiation and engagement   Pt will benefit from continued skilled OT services to address deficits as defined above and to maximize level independence/participation during ADLs and functional tasks to facilitate return toward PLOF and improved quality of life  From an occupational therapy standpoint, recommendation at time of d/c would be return to facility with rehab services  Plan   Treatment Interventions ADL retraining;Functional transfer training;UE strengthening/ROM; Endurance training;Cognitive reorientation;Patient/family training;Equipment evaluation/education; Neuromuscular reeducation; Compensatory technique education;Continued evaluation; Energy conservation; Activityengagement   Goal Expiration Date 07/06/23   OT Frequency 3-5x/wk   Recommendation   OT Discharge Recommendation Return to facility with rehabilitation services   AM-PAC Daily Activity Inpatient   Lower Body Dressing 2   Bathing 2   Toileting 2   Upper Body Dressing 3   Grooming 3   Eating 3   Daily Activity Raw Score 15   Daily Activity Standardized Score (Calc for Raw Score >=11) 34 69   AM-[facility] Applied Cognition Inpatient   Following a Speech/Presentation 2   Understanding Ordinary Conversation 3   Taking Medications 1   Remembering Where Things Are Placed or Put Away 2   Remembering List of 4-5 Errands 2   Taking Care of Complicated Tasks 1   Applied Cognition Raw Score 11   Applied Cognition Standardized Score 27 03     The patient's raw score on the AM-PAC Daily Activity Inpatient Short Form is 15  A raw score of less than 19 suggests the patient may benefit from discharge to post-acute rehabilitation services  Please refer to the recommendation of the Occupational Therapist for safe discharge planning  Pt goals to be met by 7/6/2023    Pt will demonstrate ability to complete LB dressing @ Min A in order to increase safety and independence during meaningful tasks  Pt will demonstrate ability to complete toileting tasks including CM and pericare @ Min A in order to increase safety and independence during meaningful tasks    Pt will demonstrate ability to complete EOB, chair, toilet/commode transfers @ Min A in order to increase performance and participation during functional tasks  Pt will demonstrate ability to stand for 3-4 minutes while maintaining F balance with use of RW for UB support PRN  Pt will demonstrate ability to tolerate 30-35 minute OT session with no vc'ing for deep breathing or use of energy conservation techniques in order to increase activity tolerance during functional tasks  Pt will demonstrate Good carryover of use of energy conservation/compensatory strategies during ADLs and functional tasks in order to increase safety and reduce risk for falls  Pt will demonstrate Good attention and participation in continued evaluation of functional ambulation house hold distances in order to assist with safe d/c planning  Pt will attend to continued cognitive assessments 100% of the time in order to provide most appropriate d/c recommendations  Pt will follow 100% simple 1-step commands and be A&O x3 consistently with environmental cues to increase participation in functional activities  Pt will identify 3 areas of interest/hobbies and 1 intervention on how to incorporate into daily life in order to increase interaction with environment and peers as well as increase participation in meaningful tasks  Pt will demonstrate 100% carryover of BUE HEP in order to increase BUE MS and increase performance during functional tasks upon d/c home      Ambreen Grover OTR/L

## 2023-06-22 NOTE — PLAN OF CARE
Problem: OCCUPATIONAL THERAPY ADULT  Goal: Performs self-care activities at highest level of function for planned discharge setting  See evaluation for individualized goals  Description: Treatment Interventions: ADL retraining, Functional transfer training, UE strengthening/ROM, Endurance training, Cognitive reorientation, Patient/family training, Equipment evaluation/education, Neuromuscular reeducation, Compensatory technique education, Continued evaluation, Energy conservation, Activityengagement          See flowsheet documentation for full assessment, interventions and recommendations  Note: Limitation: Decreased ADL status, Decreased UE strength, Decreased Safe judgement during ADL, Decreased cognition, Decreased endurance, Decreased self-care trans, Decreased high-level ADLs  Prognosis: Good  Assessment: Pt is a 80 y o  female, admitted to [address] [place] [address] 6/21/2023 d/t experiencing SOB and vomiting  Dx: sepsis with acute hypoxic respiratory faliure  Pt with PMHx impacting their performance during ADL tasks, including: alzheimer's dementia, HTN, hyperlipidemia,   Prior to admission to the hospital Pt was performing ADLs with physical assistance  IADLs with physical assistance  Functional transfers/ambulation with physical assistance  Cognitive status was PTA was impaired  OT order placed to assess Pt's ADLs, cognitive status, and performance during functional tasks in order to maximize safety and independence while making most appropriate d/c recommendations   Pt's clinical presentation is currently unstable/unpredictable given new onset deficits that effect Pt's occupational performance and ability to safely return to [facility] including decrease activity tolerance, decrease standing balance, decrease performance during ADL tasks, decrease cognition, decrease safety awareness , decrease UB MS, decrease generalized strength, decrease activity engagement, decrease performance during functional transfers, high fall risk and limited insight to deficits combined with medical complications of hypotension, low SpO2 values, new onset O2 use, decreased skin integrity, incontinence, fear/retreat and need for input for mobility technique/safety  Pt was on 2L in ED although has since been weened to RA and maintaining >93% with no SOB noted  Personal factors affecting Pt at time of initial evaluation include: advanced age, past experience, behavioral pattern, inability to perform current job functions, inability to perform IADLs, inability to perform ADLs, inability to ambulate household distances, inability to navigate community distances, limited insight into impairments and decreased initiation and engagement  Pt will benefit from continued skilled OT services to address deficits as defined above and to maximize level independence/participation during ADLs and functional tasks to facilitate return toward PLOF and improved quality of life  From an occupational therapy standpoint, recommendation at time of d/c would be return to facility with rehab services       OT Discharge Recommendation: Return to facility with rehabilitation services done

## 2023-06-23 LAB
ALL TARGETS: NOT DETECTED
BACTERIA UR CULT: ABNORMAL
MRSA NOSE QL CULT: NORMAL

## 2023-06-23 PROCEDURE — 99232 SBSQ HOSP IP/OBS MODERATE 35: CPT | Performed by: INTERNAL MEDICINE

## 2023-06-23 PROCEDURE — 97116 GAIT TRAINING THERAPY: CPT

## 2023-06-23 PROCEDURE — 97535 SELF CARE MNGMENT TRAINING: CPT

## 2023-06-23 PROCEDURE — 97110 THERAPEUTIC EXERCISES: CPT

## 2023-06-23 RX ADMIN — Medication 1 TABLET: at 08:22

## 2023-06-23 RX ADMIN — POLYETHYLENE GLYCOL 3350 17 G: 17 POWDER, FOR SOLUTION ORAL at 17:21

## 2023-06-23 RX ADMIN — MEMANTINE 10 MG: 10 TABLET ORAL at 08:23

## 2023-06-23 RX ADMIN — PIPERACILLIN AND TAZOBACTAM 3.38 G: 36; 4.5 INJECTION, POWDER, FOR SOLUTION INTRAVENOUS at 04:02

## 2023-06-23 RX ADMIN — HEPARIN SODIUM 5000 UNITS: 5000 INJECTION INTRAVENOUS; SUBCUTANEOUS at 22:47

## 2023-06-23 RX ADMIN — MEMANTINE 10 MG: 10 TABLET ORAL at 17:21

## 2023-06-23 RX ADMIN — PIPERACILLIN AND TAZOBACTAM 3.38 G: 36; 4.5 INJECTION, POWDER, FOR SOLUTION INTRAVENOUS at 10:49

## 2023-06-23 RX ADMIN — PIPERACILLIN AND TAZOBACTAM 3.38 G: 36; 4.5 INJECTION, POWDER, FOR SOLUTION INTRAVENOUS at 16:18

## 2023-06-23 RX ADMIN — POLYETHYLENE GLYCOL 3350 17 G: 17 POWDER, FOR SOLUTION ORAL at 08:22

## 2023-06-23 RX ADMIN — PIPERACILLIN AND TAZOBACTAM 3.38 G: 36; 4.5 INJECTION, POWDER, FOR SOLUTION INTRAVENOUS at 22:47

## 2023-06-23 RX ADMIN — HEPARIN SODIUM 5000 UNITS: 5000 INJECTION INTRAVENOUS; SUBCUTANEOUS at 13:59

## 2023-06-23 RX ADMIN — LEVOTHYROXINE SODIUM 125 MCG: 125 TABLET ORAL at 05:36

## 2023-06-23 RX ADMIN — SENNOSIDES 8.6 MG: 8.6 TABLET, FILM COATED ORAL at 08:23

## 2023-06-23 RX ADMIN — HEPARIN SODIUM 5000 UNITS: 5000 INJECTION INTRAVENOUS; SUBCUTANEOUS at 05:36

## 2023-06-23 RX ADMIN — CYANOCOBALAMIN TAB 500 MCG 1000 MCG: 500 TAB at 08:23

## 2023-06-23 RX ADMIN — ASPIRIN 81 MG CHEWABLE TABLET 81 MG: 81 TABLET CHEWABLE at 08:23

## 2023-06-23 NOTE — ASSESSMENT & PLAN NOTE
Patient has no history noted of cirrhosis in chart  On CT abdomen pelvis noted to have hepatic cirrhosis, no ascites mentioned on scan  Does have some abdominal distention, patient denies feeling distended    LFTs within normal limits  Monitor  No longer aggitated

## 2023-06-23 NOTE — PLAN OF CARE
Problem: Potential for Falls  Goal: Patient will remain free of falls  Description: INTERVENTIONS:  - Educate patient/family on patient safety including physical limitations  - Instruct patient to call for assistance with activity   - Consult OT/PT to assist with strengthening/mobility   - Keep Call bell within reach  - Keep bed low and locked with side rails adjusted as appropriate  - Keep care items and personal belongings within reach  - Initiate and maintain comfort rounds  - Make Fall Risk Sign visible to staff  - Offer Toileting every 2 Hours, in advance of need  - Initiate/Maintain bed/chair alarm  - Obtain necessary fall risk management equipment: bed/chair alarm  - Apply yellow socks and bracelet for high fall risk patients  - Consider moving patient to room near nurses station  Outcome: Progressing     Problem: MOBILITY - ADULT  Goal: Maintain or return to baseline ADL function  Description: INTERVENTIONS:  -  Assess patient's ability to carry out ADLs; assess patient's baseline for ADL function and identify physical deficits which impact ability to perform ADLs (bathing, care of mouth/teeth, toileting, grooming, dressing, etc )  - Assess/evaluate cause of self-care deficits   - Assess range of motion  - Assess patient's mobility; develop plan if impaired  - Assess patient's need for assistive devices and provide as appropriate  - Encourage maximum independence but intervene and supervise when necessary  - Involve family in performance of ADLs  - Assess for home care needs following discharge   - Consider OT consult to assist with ADL evaluation and planning for discharge  - Provide patient education as appropriate  Outcome: Progressing  Goal: Maintains/Returns to pre admission functional level  Description: INTERVENTIONS:  - Perform BMAT or MOVE assessment daily    - Set and communicate daily mobility goal to care team and patient/family/caregiver     - Collaborate with rehabilitation services on mobility goals if consulted  - Perform Range of Motion 3 times a day  - Reposition patient every 2 hours    - Dangle patient 3 times a day  - Stand patient 3 times a day  - Ambulate patient 3 times a day  - Out of bed to chair 3 times a day   - Out of bed for meals 3 times a day  - Out of bed for toileting  - Record patient progress and toleration of activity level   Outcome: Progressing

## 2023-06-23 NOTE — ASSESSMENT & PLAN NOTE
"Patient with no baseline oxygen needs reports the ER with shortness of breath and hypoxia at nursing home  Possibly in the setting of aspiration given had episode of vomiting prior  CT -   \"   No acute pulmonary embolus      Few scattered groundglass and reticular opacities in the mid to lower lung fields are nonspecific  No focal consolidation or suspicious mass  This may be secondary to mild air trapping or scattered subsegmental atelectasis/scarring  Atypical/viral   infection not excluded  Underlying interstitial lung disease not excluded  \"  Improved from this standpoint           "

## 2023-06-23 NOTE — CASE MANAGEMENT
Case Management Assessment & Discharge Planning Note    Patient name Joi   Location [address] [id number] MRN [id number]  : 10/25/1929 Date 2023       Current Admission Date: 2023  Current Admission Diagnosis:Sepsis with acute hypoxic respiratory failure [facility])   Patient Active Problem List    Diagnosis Date Noted   • Sepsis with acute hypoxic respiratory failure ([facility] ) 2023   • HTN (hypertension) 2023   • HLD (hyperlipidemia) 2023   • Alzheimer's dementia ([facility] [address] [facility] ) 2023   • Elevated troponin 2023   • Vomiting 2023   • Cirrhosis ([facility] [address] ) 2023   • Pain of left calf 2023   • Acute respiratory failure ([facility] ) 2023   • Atherosclerotic heart disease 2023   • Hypothyroidism 2023      LOS (days): 2  Geometric Mean LOS (GMLOS) (days): 5 00  Days to GMLOS:3     OBJECTIVE:    Risk of Unplanned Readmission Score: 11 91         Current admission status: Inpatient  Referral Reason:  (return to [facility] ns and rehab no auth needed MA bed hold)    Preferred Pharmacy: No Pharmacies Listed  Primary Care Provider: Althea Hughes MD    Primary Insurance: [id number] [id number]  Secondary Insurance: [id number]    ASSESSMENT:      CM spoke with [facility] nsg and rehab ,baseline information  was obtained  CM discussed the role of CM in helping the patient develop a discharge plan and assist the patient in carry out their plan  Patient has been at [facility] since 2016 MA bed hold no auth needed to return    [facility] Proxies    There are no active Health Care Proxies on file  Readmission Root Cause  30 Day Readmission: No    Patient Information  Admitted from[de-identified] Facility  Mental Status: Confused  During Assessment patient was accompanied by: Other-Comment  Assessment information provided by[de-identified] Other - please comment ([facility] and rehab)  Primary Caregiver:  Other (Comment) ([facility] rehab at this time)  Support Systems: Daughter  [place] of Residence: [address] do you live in?: [id number] entry access options  Select all that apply : No steps to enter home  Type of Current Residence: Facility  Upon entering residence, is there a bedroom on the main floor (no further steps)?: Yes  Upon entering residence, is there a bathroom on the main floor (no further steps)?: Yes  In the last 12 months, was there a time when you were not able to pay the mortgage or rent on time?: No  In the last 12 months, how many places have you lived?: 1  In the last 12 months, was there a time when you did not have a steady place to sleep or slept in a shelter (including now)?: No  Homeless/housing insecurity resource given?: N/A  Living Arrangements: Other (Comment) ([facility] and rehab)  Is patient a ?: No    Activities of Daily Living Prior to Admission  Functional Status: Total dependent  Completes ADLs independently?: No  Level of ADL dependence: Total Dependent  Ambulates independently?: No  Level of ambulatory dependence: Total Dependent  Does patient use assisted devices?: Yes  Assisted Devices (DME) used:  Wheelchair  Does patient currently own DME?: No  Does patient have a history of Outpatient Therapy (PT/OT)?: No  Does the patient have a history of Short-Term Rehab?: Yes  Does patient have a history of HHC?: No  Does patient currently have [address]?: No         Patient Information Continued  Income Source: Pension/[facility]  Does patient have prescription coverage?: Yes  Within the past 12 months, you worried that your food would run out before you got the money to buy more : Never true  Within the past 12 months, the food you bought just didn't last and you didn't have money to get more : Never true  Food insecurity resource given?: N/A  Does patient receive dialysis treatments?: No  Does patient have a history of substance abuse?: No  Does patient have a history of Mental Health Diagnosis?: No         Means of Transportation  Means of Transport to Appts[de-identified] Other (Comment) (facility transports)  In the past 12 months, has lack of transportation kept you from meetings, work, or from getting things needed for daily living?: No  Was application for public transport provided?: N/A        DISCHARGE DETAILS:    Discharge planning discussed with[de-identified] [facility] and rehab     Comments - [place] of Choice: patient to return to [facility]  CM contacted family/caregiver?:  (not at this time)  Were Treatment Team discharge recommendations reviewed with patient/caregiver?: Yes  Did patient/caregiver verbalize understanding of patient care needs?: Yes  Were patient/caregiver advised of the risks associated with not following Treatment Team discharge recommendations?: Yes         [address]         [place] the [place] [address] at discharge?: No           Treatment Team Recommendation: SNF  Discharge Destination Plan[de-identified] SNF  Transport at Discharge : BLS Ambulance      CM spoke with patient daughter  Jonathan Donovan and she is in agreement with patient returning to [facility] and rehab  Daughter aware transport  Back to [facility] and rehab  time anticipated for 1400 tomorrow pending patient stable for discharge   Daughter in agreement       CM to follow

## 2023-06-23 NOTE — PROGRESS NOTES
"[address]  Progress Note  Name: Ignacia Nolen  MRN: [id number]  Unit/Bed#: -01 I Date of Admission: 6/21/2023   Date of Service: 6/23/2023 I Hospital Day: 2    Assessment/Plan   Hypothyroidism  Assessment & Plan  On levothyroxine, continue    Atherosclerotic heart disease  Assessment & Plan  Not maintained on statin  Takes 81 mg aspirin daily  Initial troponin 9, repeat 84  First EKG without significant ischemic changes, will repeat  Denies any chest pain or SOB    Acute respiratory failure ([address] )  Assessment & Plan  Patient with no baseline oxygen needs reports the ER with shortness of breath and hypoxia at nursing home  Possibly in the setting of aspiration given had episode of vomiting prior  CT -   \"   No acute pulmonary embolus      Few scattered groundglass and reticular opacities in the mid to lower lung fields are nonspecific  No focal consolidation or suspicious mass  This may be secondary to mild air trapping or scattered subsegmental atelectasis/scarring  Atypical/viral   infection not excluded  Underlying interstitial lung disease not excluded  \"  Improved from this standpoint             Pain of left calf  Assessment & Plan  Pain to palpation of left calf, no erythema or significant swelling  Patient is overall poor historian    Venous doppler - RIGHT LOWER LIMB LIMITED:  Evaluation shows no evidence of thrombus in the common femoral vein  Doppler evaluation shows a normal response to augmentation maneuvers  LEFT LOWER LIMB:  No evidence of acute or chronic deep vein thrombosis  No evidence of superficial thrombophlebitis noted  Doppler evaluation shows a normal response to augmentation maneuvers  Popliteal, posterior tibial and anterior tibial arterial Doppler waveform's are  monophasic           Cirrhosis [facility])  Assessment & Plan  Patient has no history noted of cirrhosis in chart  On CT abdomen pelvis noted to have hepatic cirrhosis, no ascites mentioned on " "scan  Does have some abdominal distention, patient denies feeling distended    LFTs within normal limits  Monitor  No longer aggitated      Vomiting  Assessment & Plan  Noted to have vomiting at SNF, patient denies this  Was nauseous in the ER, resolved after Zofran  Continue as needed Zofran  CT abdomen pelvis negative for acute pathology  Aspiration precautions- resolved      Elevated troponin  Assessment & Plan  · Initial troponin 9, repeat is 84  · Likely demand in setting of sepsis and tachycardia, denies any chest pain   · Alert on telemetry  · Nitro as needed, continue aspirin per home regimen    Alzheimer's dementia ([facility] [address] [facility] )  Assessment & Plan  · Continue Namenda  · She does currently at her baseline  · Patient's daughter states she has a history of \"wandering\", patient's room close to nursing station for close monitoring    HLD (hyperlipidemia)  Assessment & Plan  Not maintained on statin given age    HTN (hypertension)  Assessment & Plan  BP is 112/52  Will monitor closely      * Sepsis with acute hypoxic respiratory failure (HCC)  Assessment & Plan  Presents to ED with shortness of breath, hypoxia and vomiting  • SIRS met: SIRS: 2/4; tachycardia, tachypnea  • Source of infection: Possibly aspiration versus UTI  • She was recently treated for cellulitis however lower extremities do not appear cellulitic  • CT chest abdomen pelvis showing nonspecific peripheral groundglass opacities in the right upper lobe, was present previously however is now mildly increased, most likely chronic interstitial lung disease no focal consolidation, mild perivesical stranding suggestive of cystitis, no infectious interabdominal pathology  Lab Results   Component Value Date    WBC 6 87 06/22/2023    PROCALCITONI 1 52 (H) 06/22/2023    LACTICACID 2 1 ([facility]) 06/21/2023       Continue with zosyn only  Follow up on cultures                 VTE Pharmacologic Prophylaxis:   Moderate Risk (Score 3-4) - Pharmacological DVT Prophylaxis " "Ordered: heparin  Patient Centered Rounds: I performed bedside rounds with nursing staff today  Discussions with Specialists or Other Care Team Provider: Discussed with CM  Education and Discussions with Family / Patient: see quick note   Total Time Spent on Date of Encounter in care of patient: 30 min  This time was spent on one or more of the following: performing physical exam; counseling and coordination of care; obtaining or reviewing history; documenting in the medical record; reviewing/ordering tests, medications or procedures; communicating with other healthcare professionals and discussing with patient's family/caregivers  Current Length of Stay: 2 day(s)  Current Patient Status: Inpatient   Certification Statement: The patient will continue to require additional inpatient hospital stay due to [facility] - transition to oral tomorrow when C&S back   Discharge Plan: Anticipate discharge tomorrow to rehab facility  Code Status: Level 3 - DNAR and DNI    Subjective:   Patient seen and examined  Feeling \"okay\"    Objective:     Vitals:   Temp (24hrs), Av 8 °F (36 6 °C), Min:97 7 °F (36 5 °C), Max:97 9 °F (36 6 °C)    Temp:  [97 7 °F (36 5 °C)-97 9 °F (36 6 °C)] 97 7 °F (36 5 °C)  HR:  [76-85] 85  Resp:  [16-18] 16  BP: (112-123)/(52-63) 112/52  SpO2:  [93 %-94 %] 93 %  Body mass index is 31 58 kg/m²  Input and Output Summary (last 24 hours): Intake/Output Summary (Last 24 hours) at 2023 1523  Last data filed at 2023 1229  Gross per 24 hour   Intake 210 ml   Output 330 ml   Net -120 ml       Physical Exam:   Physical Exam  Constitutional:       General: She is not in acute distress  Appearance: She is not ill-appearing, toxic-appearing or diaphoretic  HENT:      Head: Normocephalic  Nose: Nose normal       Mouth/Throat:      Mouth: Mucous membranes are moist    Eyes:      General: No scleral icterus  Right eye: No discharge  Left eye: No discharge       " Pupils: Pupils are equal, round, and reactive to light  Pulmonary:      Effort: No respiratory distress  Breath sounds: No stridor  No wheezing, rhonchi or rales  Abdominal:      General: Abdomen is flat  There is no distension  Palpations: There is no mass  Tenderness: There is no abdominal tenderness  There is no right CVA tenderness, left CVA tenderness, guarding or rebound  Skin:     Coloration: Skin is not jaundiced or pale  Findings: No bruising, erythema or lesion  Neurological:      General: No focal deficit present  Mental Status: She is alert  Cranial Nerves: No cranial nerve deficit  Sensory: No sensory deficit  Motor: No weakness  Gait: Gait normal       Deep Tendon Reflexes: Reflexes normal    Psychiatric:         Mood and Affect: Mood normal       Comments: A little grumpy   Not in the mood to answer a lot of questions             Additional Data:     Labs:  Results from last 7 days   Lab Units 06/22/23  0536   WBC Thousand/uL 6 87   HEMOGLOBIN g/dL 11 7   HEMATOCRIT % 36 4   PLATELETS Thousands/uL 131*   NEUTROS PCT % 70   LYMPHS PCT % 21   MONOS PCT % 8   EOS PCT % 1     Results from last 7 days   Lab Units 06/22/23  0536   SODIUM mmol/L 137   POTASSIUM mmol/L 3 8   CHLORIDE mmol/L 102   CO2 mmol/L 30   BUN mg/dL 14   CREATININE mg/dL 0 95   ANION GAP mmol/L 5   CALCIUM mg/dL 8 2*   ALBUMIN g/dL 3 1*   TOTAL BILIRUBIN mg/dL 1 27*   ALK PHOS U/L 54   ALT U/L 20   AST U/L 25   GLUCOSE RANDOM mg/dL 106     Results from last 7 days   Lab Units 06/22/23  0536   INR  1 10             Results from last 7 days   Lab Units 06/22/23  0536 06/21/23  2155 06/21/23  1844 06/21/23  1622 06/21/23  1416   LACTIC ACID mmol/L  --  2 1* 2 1* 2 1* 2 2*   PROCALCITONIN ng/ml 1 52*  --   --   --  0 14       Lines/Drains:  Invasive Devices     Peripheral Intravenous Line  Duration           Peripheral IV 06/23/23 Dorsal (posterior); Right Forearm <1 day Imaging: No pertinent imaging reviewed  Recent Cultures (last 7 days):   Results from last 7 days   Lab Units 06/21/23  1439 06/21/23  1434 06/21/23  1416   BLOOD CULTURE  No Growth at 24 hrs   --  No Growth at 24 hrs  URINE CULTURE   --  >100,000 cfu/ml Proteus mirabilis*  --        Last 24 Hours Medication List:   Current Facility-Administered Medications   Medication Dose Route Frequency Provider Last Rate   • acetaminophen  650 mg Oral Q6H PRN Vicky Hutchins PA-C     • aluminum-magnesium hydroxide-simethicone  30 mL Oral Q6H PRN Vicky Hutchins PA-C     • aspirin  81 mg Oral Daily Vicky Hutchins PA-C     • vitamin B-12  1,000 mcg Oral Daily Vicky Hutchins PA-C     • heparin (porcine)  5,000 Units Subcutaneous [facility] Vicky Hutchins PA-C     • levothyroxine  125 mcg Oral Early Morning Vicky Hutchins PA-C     • memantine  10 mg Oral BID Vicky Hutchins PA-C     • multivitamin-minerals  1 tablet Oral Daily Vicky Hutchins PA-C     • ondansetron  4 mg Intravenous Q6H PRN Vicky Hutchins PA-C     • piperacillin-tazobactam  3 375 g Intravenous Q6H Vicky Hutchins PA-C 3 375 g (06/23/23 1049)   • polyethylene glycol  17 g Oral BID Vicky Hutchins PA-C     • senna  1 tablet Oral Daily Vicky Hutchins PA-C          Today, Patient Was Seen By: Shanon Harrington MD    **Please Note: This note may have been constructed using a voice recognition system  **

## 2023-06-23 NOTE — ASSESSMENT & PLAN NOTE
Pain to palpation of left calf, no erythema or significant swelling  Patient is overall poor historian    Venous doppler - RIGHT LOWER LIMB LIMITED:  Evaluation shows no evidence of thrombus in the common femoral vein  Doppler evaluation shows a normal response to augmentation maneuvers  LEFT LOWER LIMB:  No evidence of acute or chronic deep vein thrombosis  No evidence of superficial thrombophlebitis noted  Doppler evaluation shows a normal response to augmentation maneuvers  Popliteal, posterior tibial and anterior tibial arterial Doppler waveform's are  monophasic

## 2023-06-23 NOTE — CASE MANAGEMENT
[address] received request for authorization from Care Manager    Authorization request for: SNF  Facility Name: [facility] NPI: [id number]   Facility MD: Dr Bright Fair: [id number]  Authorization initiated by contacting insurance: [facility] Via: [facility] Portal   Pending Reference #: [id number]   Clinicals submitted via: [facility] Portal

## 2023-06-23 NOTE — CASE MANAGEMENT
Case Management Discharge Planning Note    Patient name Maryellen Mota  Location [id number] MRN [id number]  : 10/25/1929 Date 2023       Current Admission Date: 2023  Current Admission Diagnosis:Sepsis with acute hypoxic respiratory failure [facility])   Patient Active Problem List    Diagnosis Date Noted   • Sepsis with acute hypoxic respiratory failure ([facility] ) 2023   • HTN (hypertension) 2023   • HLD (hyperlipidemia) 2023   • Alzheimer's dementia ([facility] ) 2023   • Elevated troponin 2023   • Vomiting 2023   • Cirrhosis ([facility] ) 2023   • Pain of left calf 2023   • Acute respiratory failure ([facility] ) 2023   • Atherosclerotic heart disease 2023   • Hypothyroidism 2023      LOS (days): 2  Geometric Mean LOS (GMLOS) (days): 5 00  Days to GMLOS:3     OBJECTIVE:  Risk of Unplanned Readmission Score: 11 88         Current admission status: Inpatient   Preferred Pharmacy: No Pharmacies Listed  Primary Care Provider: Maricruz Monique MD    Primary Insurance: [facility] REP  Secondary Insurance: [address] [place] [address] DETAILS:     Auth started for return to [facility] return  Auth is not needed to return patient is MA bed hold  But patient may qualify for rehab post hospital       Call placed to [facility] they can accept patient tomorrow if stable for discharge  Request for transport placed in round trip approx  time 1400 by BLS due to dementia and deconditioned  Awaiting confirmed  time     Medical Necessity for transportation was completed  Copy available for transport team along with face sheet placed in patient chart on unit for transfer      CM to follow

## 2023-06-23 NOTE — OCCUPATIONAL THERAPY NOTE
Occupational Therapy Progress Note     Patient Name: Juan FARAHJGarciaH Date: 6/23/2023  Problem List  Principal Problem:    Sepsis with acute hypoxic respiratory failure (HCC)  Active Problems:    HTN (hypertension)    HLD (hyperlipidemia)    Alzheimer's dementia (HCC)    Elevated troponin    Vomiting    Cirrhosis (HCC)    Pain of left calf    Acute respiratory failure (HCC)    Atherosclerotic heart disease    Hypothyroidism          06/23/23 0916   Note Type   Note Type Treatment   Pain Assessment   Pain Assessment Tool 0-10   Pain Score No Pain   Restrictions/Precautions   Other Precautions Chair Alarm;Cognitive; Fall Risk   ADL   Where Assessed Chair   Grooming Assistance 5  Supervision/Setup   Grooming Deficit Setup   UB Dressing Assistance 5  Supervision/Setup   UB Dressing Deficit Setup   LB Dressing Assistance 2  Maximal Assistance   LB Dressing Deficit Steadying; Requires assistive device for steadying;Verbal cueing;Supervision/safety; Increased time to complete; Don/doff L sock; Don/doff R sock; Thread RLE into pants; Thread LLE into pants;Pull up over hips   LB Dressing Comments Max A for donning socks/pants around feet and Mod A for CM around waist with UB supported from RW PRN  Toileting Assistance  3  Moderate Assistance   Toileting Deficit Steadying;Verbal cueing;Supervison/safety; Increased time to complete; Bedside commode;Clothing management up;Clothing management down;Perineal hygiene   Toileting Comments Pt completing toileting tasks with use of BSc @ Mod A for thorough pericare and balance/safety w hile ocmpleting CM around waist with UB supported from RW   Bed Mobility   Additional Comments Pt seated OOB at start and end of session with call bell in reach, chair alarm intact and all needs met at this time   Transfers   Sit to Stand 4  Minimal assistance   Additional items Assist x 1; Increased time required;Verbal cues   Stand to Sit   [facility] Assist)   Additional items Assist x 1; Increased time required;Verbal cues   Stand pivot 4  Minimal assistance   Additional items Assist x 1; Increased time required;Verbal cues   Toilet transfer   (Steadying Assist)   Additional items Assist x 1; Increased time required;Verbal cues; Commode   Additional Comments Pt completing functional transfers with use of RW for UB support  Min A for STS and STP with increased time, vc'ing for safety and hand placement, RW management and weight shifting  Therapeutic Excerise-Strength   UE Strength Yes   Right Upper Extremity- Strength   R Shoulder Flexion;ABduction   R Elbow Elbow flexion;Elbow extension   R Position Seated   R Weight/Reps/Sets 2 sets of 10   RUE Strength Comment Pt competing BUE HEP while seated upright in recliner chair  Pt completing 2 sets of 10 with exercises focusing on biceps, triceps, and shoulder/chest  Pt tolerating exercises well with Min vc'ing for maintaining proper movements and speed  Exercises completed this date to maximize overall BUE MS and increased safety and independence during ADLs and functional transfers  Pt verbalizes understanding and reports they will complete daily  Pt would benefit from continued review to ensure proper carryover upon d/c from [address] [place] [address]  At end of session, Pt seated OOB in recliner chair with call bell in reach, chair alarm intact and all needs met at this time  Left Upper Extremity-Strength   L Shoulder Flexion;ABduction   L Elbow Elbow flexion;Elbow extension   L Position Seated   L Weights/Reps/Sets 2 sets of 10   LUE Strength Comment Pt competing BUE HEP while seated upright in recliner chair  Pt completing 2 sets of 10 with exercises focusing on biceps, triceps, and shoulder/chest  Pt tolerating exercises well with Min vc'ing for maintaining proper movements and speed  Exercises completed this date to maximize overall BUE MS and increased safety and independence during ADLs and functional transfers  Pt verbalizes understanding and reports they will complete daily  Pt would benefit from continued review to ensure proper carryover upon d/c from [address] [place] [address]  At end of session, Pt seated OOB in recliner chair with call bell in reach, chair alarm intact and all needs met at this time  Cognition   Overall Cognitive Status Impaired   Arousal/Participation Alert; Responsive; Cooperative   Attention Difficulty attending to directions   Orientation Level Disoriented X4   Memory Decreased long term memory;Decreased recall of biographical information   Following Commands Follows one step commands with increased time or repetition   Activity Tolerance   Activity Tolerance Patient limited by fatigue   Medical Staff Made Aware Spoke with PT, Serenity and JAIME Giron   Assessment   Assessment Pt seen for treatment session #1 this date  Pt alert and agreeable to participate at this time  Upon entering room, Pt attempting to climb out of recliner chair  Therapist offered assistance and was able to complete treatment with Pt, including ADLs, toileting and BUE HEP  Pt tolerating well with increased performance compared to previous evaluation  Pt with good potential to make progress towards goals, although is currently limited by decrease activity tolerance, decrease standing balance, decrease sitting balance, decrease performance during ADL tasks, decrease cognition, decrease safety awareness , increase impulsiveness, decrease UB MS, decrease generalized strength, decrease activity engagement, decrease performance during functional transfers, high fall risk and limited insight to deficits  Pt would benefit from continued acute OT services to address deficits as well as post acute rehab upon d/c from [address] [place] [address]  Plan   Treatment Interventions ADL retraining;Functional transfer training;UE strengthening/ROM; Endurance training;Cognitive reorientation;Patient/family training;Equipment evaluation/education; Neuromuscular reeducation; Compensatory technique education;Continued evaluation; Energy conservation; Activityengagement   Goal Expiration Date 07/06/23   OT Treatment Day 1   OT Frequency 3-5x/wk   Recommendation   OT Discharge Recommendation Return to facility with rehabilitation services   AM-PAC Daily Activity Inpatient   Lower Body Dressing 2   Bathing 2   Toileting 2   Upper Body Dressing 3   Grooming 3   Eating 3   Daily Activity Raw Score 15   Daily Activity Standardized Score (Calc for Raw Score >=11) 34 69   AM-PAC Applied Cognition Inpatient   Following a Speech/Presentation 2   Understanding Ordinary Conversation 3   Taking Medications 1   Remembering Where Things Are Placed or Put Away 2   Remembering List of 4-5 Errands 2   Taking Care of Complicated Tasks 1   Applied Cognition Raw Score 11   Applied Cognition Standardized Score 27 03         Pt goals to be met by 7/6/2023     Pt will demonstrate ability to complete LB dressing @ Min A in order to increase safety and independence during meaningful tasks  Pt will demonstrate ability to complete toileting tasks including CM and pericare @ Min A in order to increase safety and independence during meaningful tasks  Pt will demonstrate ability to complete EOB, chair, toilet/commode transfers @ Min A in order to increase performance and participation during functional tasks  Pt will demonstrate ability to stand for 3-4 minutes while maintaining F balance with use of RW for UB support PRN  Pt will demonstrate ability to tolerate 30-35 minute OT session with no vc'ing for deep breathing or use of energy conservation techniques in order to increase activity tolerance during functional tasks  Pt will demonstrate Good carryover of use of energy conservation/compensatory strategies during ADLs and functional tasks in order to increase safety and reduce risk for falls  Pt will demonstrate Good attention and participation in continued evaluation of functional ambulation house hold distances in order to assist with safe d/c planning    Pt will attend to continued cognitive assessments 100% of the time in order to provide most appropriate d/c recommendations  Pt will follow 100% simple 1-step commands and be A&O x3 consistently with environmental cues to increase participation in functional activities  Pt will identify 3 areas of interest/hobbies and 1 intervention on how to incorporate into daily life in order to increase interaction with environment and peers as well as increase participation in meaningful tasks  Pt will demonstrate 100% carryover of BUE HEP in order to increase BUE MS and increase performance during functional tasks upon d/c home       Sruthi Lopez OTR/L

## 2023-06-23 NOTE — PLAN OF CARE
Problem: PHYSICAL THERAPY ADULT  Goal: Performs mobility at highest level of function for planned discharge setting  See evaluation for individualized goals  Description: Treatment/Interventions: Functional transfer training, LE strengthening/ROM, Therapeutic exercise, Endurance training, Patient/family training, Cognitive reorientation, Equipment eval/education, Bed mobility, Gait training, Compensatory technique education, Spoke to nursing, OT  Equipment Recommended:  (TBD by facility)       See flowsheet documentation for full assessment, interventions and recommendations  Outcome: Progressing  Note: Prognosis: Fair  Problem List: Decreased strength, Decreased endurance, Impaired balance, Decreased mobility, Decreased cognition, Impaired judgement, Decreased safety awareness, Obesity  Assessment: Pt seen for PT treatment session this date, consisting of ther act focused on functional transfer training from various surfaces, ther ex focused on strengthening and gt training on level surfaces to improve pt safety in household environment  Since previous session, pt has made very good progress in terms of improved OOB mobility tolerance c less physical assistance  Pertinent barriers during this session include cognitive status  Current goals and POC remain appropriate, pt continues to have rehab potential and is making good progress towards STGs  Pt prognosis for achieving goals is fair, pending pt progress with hospitalization/medical status improvements, and indicated by eye contact  Pt limited d/t poor orientation, inability to concentrate under maximum structure and lack of ability to demonstrate mobility and/or self-care activities  PT recommends return to facility with rehabilitation services upon discharge  Pt continues to be functioning below baseline level, and remains limited 2* factors listed above   PT will continue to see pt during current hospitalization in order to address the deficits listed above and provide interventions consistent w/ POC in effort to achieve STGs  Barriers to Discharge: None  Barriers to Discharge Comments: Pt resides in SNF  PT Discharge Recommendation: Return to facility with rehabilitation services    See flowsheet documentation for full assessment

## 2023-06-23 NOTE — PLAN OF CARE
Problem: OCCUPATIONAL THERAPY ADULT  Goal: Performs self-care activities at highest level of function for planned discharge setting  See evaluation for individualized goals  Description: Treatment Interventions: ADL retraining, Functional transfer training, UE strengthening/ROM, Endurance training, Cognitive reorientation, Patient/family training, Equipment evaluation/education, Neuromuscular reeducation, Compensatory technique education, Continued evaluation, Energy conservation, Activityengagement          See flowsheet documentation for full assessment, interventions and recommendations  Outcome: Progressing  Note: Limitation: Decreased ADL status, Decreased UE strength, Decreased Safe judgement during ADL, Decreased cognition, Decreased endurance, Decreased self-care trans, Decreased high-level ADLs  Prognosis: Good  Assessment: Pt seen for treatment session #1 this date  Pt alert and agreeable to participate at this time  Upon entering room, Pt attempting to climb out of recliner chair  Therapist offered assistance and was able to complete treatment with Pt, including ADLs, toileting and BUE HEP  Pt tolerating well with increased performance compared to previous evaluation  Pt with good potential to make progress towards goals, although is currently limited by decrease activity tolerance, decrease standing balance, decrease sitting balance, decrease performance during ADL tasks, decrease cognition, decrease safety awareness , increase impulsiveness, decrease UB MS, decrease generalized strength, decrease activity engagement, decrease performance during functional transfers, high fall risk and limited insight to deficits  Pt would benefit from continued acute OT services to address deficits as well as post acute rehab upon d/c from [address] [place] [address]       OT Discharge Recommendation: Return to facility with rehabilitation services

## 2023-06-23 NOTE — PHYSICAL THERAPY NOTE
"Physical Therapy Treatment Note       06/23/23 0939   PT Last Visit   PT Visit Date 06/23/23   Note Type   Note Type Treatment   Pain Assessment   Pain Assessment Tool 0-10   Pain Score No Pain   Pain Rating: FLACC (Rest) - Face 0   Pain Rating: FLACC (Rest) - Legs 0   Pain Rating: FLACC (Rest) - Activity 0   Pain Rating: FLACC (Rest) - Cry 0   Pain Rating: FLACC (Rest) - Consolability 0   Score: FLACC (Rest) 0   Restrictions/Precautions   Weight Bearing Precautions Per Order No   Other Precautions Cognitive; Chair Alarm; Bed Alarm; Impulsive; Fall Risk  (poor safety awareness)   General   Chart Reviewed Yes   Response to Previous Treatment Patient unable to report, no changes reported from family or staff   Family/Caregiver Present No   Cognition   Overall Cognitive Status Impaired   Arousal/Participation Alert; Cooperative   Attention Difficulty attending to directions   Orientation Level Oriented to person;Disoriented to place; Disoriented to time;Disoriented to situation  (pt able to state full name; date/month of bday, but not year)   Memory Decreased long term memory;Decreased recall of biographical information   Following Commands Follows one step commands with increased time or repetition   Comments pt pleasant   Subjective   Subjective \"I need to use the bathroom\"   Bed Mobility   Supine to Sit Unable to assess  (pt received OOB in recliner upon arrival)   Transfers   Sit to Stand 4  Minimal assistance   Additional items Assist x 1; Increased time required;Verbal cues   Stand to Sit   (CGA)   Additional items Assist x 1; Armrests; Increased time required   Toilet transfer   (see OT note)   Ambulation/Elevation   Gait pattern Improper Weight shift; Forward Flexion;Decreased foot clearance;Shuffling; Short stride   Gait Assistance   (CG/min Ax1)   Additional items Assist x 1   Assistive Device Rolling walker   Distance 25 ft   Stair Management Assistance Not tested   Balance   Static Sitting Fair +   Dynamic Sitting " Fair   Static Standing Fair -   Dynamic Standing Poor +   Ambulatory Poor +   Endurance Deficit   Endurance Deficit Yes   Activity Tolerance   Activity Tolerance Patient limited by fatigue;Treatment limited secondary to medical complications (Comment)  (decreased cognition)   Medical Staff Made Aware coordination of care provided with OT [place] for safety   Nurse Made Aware Yes, RN made aware   Exercises   Knee AROM Long Arc Quad Sitting;15 reps;AROM; Right;Left   Ankle Pumps Sitting;15 reps;AROM; Right;Left   Marching Sitting;15 reps;AROM; Right;Left   Assessment   Prognosis Fair   Problem List Decreased strength;Decreased endurance; Impaired balance;Decreased mobility; Decreased cognition; Impaired judgement;Decreased safety awareness; Obesity   Assessment Pt seen for PT treatment session this date, consisting of ther act focused on functional transfer training from various surfaces, ther ex focused on strengthening and gt training on level surfaces to improve pt safety in household environment  Since previous session, pt has made very good progress in terms of improved OOB mobility tolerance c less physical assistance  Pertinent barriers during this session include cognitive status  Current goals and POC remain appropriate, pt continues to have rehab potential and is making good progress towards STGs  Pt prognosis for achieving goals is fair, pending pt progress with hospitalization/medical status improvements, and indicated by eye contact  Pt limited d/t poor orientation, inability to concentrate under maximum structure and lack of ability to demonstrate mobility and/or self-care activities  PT recommends return to facility with rehabilitation services upon discharge  Pt continues to be functioning below baseline level, and remains limited 2* factors listed above   PT will continue to see pt during current hospitalization in order to address the deficits listed above and provide interventions consistent w/ POC in effort to achieve STGs  Barriers to Discharge None   Goals   Patient Goals pt unable to report d/t decreased cognition   STG Expiration Date 07/06/23   Short Term Goal #1 goals remain appropriate   PT Treatment Day 1   Plan   Treatment/Interventions Functional transfer training;LE strengthening/ROM; Therapeutic exercise; Endurance training;Patient/family training;Cognitive reorientation;Equipment eval/education; Bed mobility;Gait training; Compensatory technique education;Spoke to nursing;OT   Progress Slow progress, cognitive deficits   PT Frequency 2-3x/wk   Recommendation   PT Discharge Recommendation Return to facility with rehabilitation services   Equipment Recommended   (pt to benefit from continued RW use while in house)   Additional Comments Pt's raw score on the [facility] Basic Mobility inpatient short form is 16, standardized score is 38 32  Patients at this level are likely to benefit from DC to [facility], however, please refer to therapist recommendation for safe DC planning     [facility] Basic Mobility Inpatient   Turning in Flat Bed Without Bedrails 3   Lying on Back to Sitting on Edge of Flat Bed Without Bedrails 3   Moving Bed to Chair 3   Standing Up From Chair Using Arms 3   Walk in Room 3   Climb 3-5 Stairs With Railing 1   Basic Mobility Inpatient Raw Score 16   Basic Mobility Standardized Score 38 32   Highest Level Of Mobility   JH-HLM Goal 5: Stand one or more mins   JH-HLM Achieved 7: Walk 25 feet or more   Education   Education Provided Mobility training;Home exercise program;Assistive device   Patient Demonstrates verbal understanding;Reinforcement needed       Lawrence Orr PT, DPT    Time of PT treatment session: 8738-8362 (total treatment time = 24 minutes)

## 2023-06-24 PROBLEM — I10 HTN (HYPERTENSION): Status: RESOLVED | Noted: 2023-06-21 | Resolved: 2023-06-24

## 2023-06-24 PROBLEM — K43.9 VENTRAL HERNIA: Status: ACTIVE | Noted: 2023-06-24

## 2023-06-24 PROBLEM — E78.5 HLD (HYPERLIPIDEMIA): Status: RESOLVED | Noted: 2023-06-21 | Resolved: 2023-06-24

## 2023-06-24 PROCEDURE — 99232 SBSQ HOSP IP/OBS MODERATE 35: CPT | Performed by: FAMILY MEDICINE

## 2023-06-24 PROCEDURE — 87040 BLOOD CULTURE FOR BACTERIA: CPT | Performed by: FAMILY MEDICINE

## 2023-06-24 RX ORDER — VANCOMYCIN HYDROCHLORIDE 1 G/200ML
1000 INJECTION, SOLUTION INTRAVENOUS EVERY 24 HOURS
Status: DISCONTINUED | OUTPATIENT
Start: 2023-06-25 | End: 2023-06-25

## 2023-06-24 RX ORDER — CEFTRIAXONE 1 G/50ML
1000 INJECTION, SOLUTION INTRAVENOUS EVERY 24 HOURS
Status: DISCONTINUED | OUTPATIENT
Start: 2023-06-24 | End: 2023-06-26 | Stop reason: HOSPADM

## 2023-06-24 RX ORDER — VANCOMYCIN HYDROCHLORIDE 1 G/200ML
1000 INJECTION, SOLUTION INTRAVENOUS ONCE
Status: COMPLETED | OUTPATIENT
Start: 2023-06-24 | End: 2023-06-24

## 2023-06-24 RX ADMIN — HEPARIN SODIUM 5000 UNITS: 5000 INJECTION INTRAVENOUS; SUBCUTANEOUS at 06:10

## 2023-06-24 RX ADMIN — ASPIRIN 81 MG CHEWABLE TABLET 81 MG: 81 TABLET CHEWABLE at 08:17

## 2023-06-24 RX ADMIN — PIPERACILLIN AND TAZOBACTAM 3.38 G: 36; 4.5 INJECTION, POWDER, FOR SOLUTION INTRAVENOUS at 04:53

## 2023-06-24 RX ADMIN — LEVOTHYROXINE SODIUM 125 MCG: 125 TABLET ORAL at 06:10

## 2023-06-24 RX ADMIN — HEPARIN SODIUM 5000 UNITS: 5000 INJECTION INTRAVENOUS; SUBCUTANEOUS at 22:01

## 2023-06-24 RX ADMIN — POLYETHYLENE GLYCOL 3350 17 G: 17 POWDER, FOR SOLUTION ORAL at 08:17

## 2023-06-24 RX ADMIN — POLYETHYLENE GLYCOL 3350 17 G: 17 POWDER, FOR SOLUTION ORAL at 17:11

## 2023-06-24 RX ADMIN — VANCOMYCIN HYDROCHLORIDE 1000 MG: 1 INJECTION, SOLUTION INTRAVENOUS at 12:11

## 2023-06-24 RX ADMIN — HEPARIN SODIUM 5000 UNITS: 5000 INJECTION INTRAVENOUS; SUBCUTANEOUS at 14:19

## 2023-06-24 RX ADMIN — MEMANTINE 10 MG: 10 TABLET ORAL at 17:11

## 2023-06-24 RX ADMIN — CEFTRIAXONE 1000 MG: 1 INJECTION, SOLUTION INTRAVENOUS at 14:47

## 2023-06-24 RX ADMIN — CYANOCOBALAMIN TAB 500 MCG 1000 MCG: 500 TAB at 08:17

## 2023-06-24 RX ADMIN — Medication 1 TABLET: at 08:17

## 2023-06-24 RX ADMIN — MEMANTINE 10 MG: 10 TABLET ORAL at 08:17

## 2023-06-24 RX ADMIN — PIPERACILLIN AND TAZOBACTAM 3.38 G: 36; 4.5 INJECTION, POWDER, FOR SOLUTION INTRAVENOUS at 10:02

## 2023-06-24 RX ADMIN — SENNOSIDES 8.6 MG: 8.6 TABLET, FILM COATED ORAL at 08:17

## 2023-06-24 NOTE — PROGRESS NOTES
Nika Velazquez is a 80 y o  female who is currently ordered Vancomycin IV with management by the Pharmacy Consult service  Relevant clinical data and objective / subjective history reviewed  Vancomycin Assessment:  Indication and Goal AUC/Trough: Pneumonia (goal -600, trough >10); Urinary tract infection (goal -600, trough >10); Bacteremia (goal -600, trough >10)  Clinical Status: stable  Micro:     Renal Function:  SCr: 0 95 mg/dL  CrCl: 38 7 mL/min  Renal replacement: Not on dialysis  Days of Therapy: 4  Current Dose: 1000 mg IV every 24 hours  Vancomycin Plan:  New Dosing: continue regimen  Estimated AUC: 440 mcg*hr/mL  Estimated Trough: 13 9 mcg/mL  Next Level: 6/27/23 @ 0600  Renal Function Monitoring: Daily BMP and [place] will continue to follow closely for s/sx of nephrotoxicity, infusion reactions and appropriateness of therapy  BMP and CBC will be ordered per protocol  We will continue to follow the patient’s culture results and clinical progress daily      Rasta Tong, Pharmacist

## 2023-06-24 NOTE — PLAN OF CARE
Problem: Potential for Falls  Goal: Patient will remain free of falls  Description: INTERVENTIONS:  - Educate patient/family on patient safety including physical limitations  - Instruct patient to call for assistance with activity   - Consult OT/PT to assist with strengthening/mobility   - Keep Call bell within reach  - Keep bed low and locked with side rails adjusted as appropriate  - Keep care items and personal belongings within reach  - Initiate and maintain comfort rounds  - Make Fall Risk Sign visible to staff  - Offer Toileting every 2 Hours, in advance of need  - Initiate/Maintain bed/chair alarm  - Obtain necessary fall risk management equipment: bed/chair alarm  - Apply yellow socks and bracelet for high fall risk patients  - Consider moving patient to room near nurses station  Outcome: Not Progressing     Problem: MOBILITY - ADULT  Goal: Maintain or return to baseline ADL function  Description: INTERVENTIONS:  -  Assess patient's ability to carry out ADLs; assess patient's baseline for ADL function and identify physical deficits which impact ability to perform ADLs (bathing, care of mouth/teeth, toileting, grooming, dressing, etc )  - Assess/evaluate cause of self-care deficits   - Assess range of motion  - Assess patient's mobility; develop plan if impaired  - Assess patient's need for assistive devices and provide as appropriate  - Encourage maximum independence but intervene and supervise when necessary  - Involve family in performance of ADLs  - Assess for home care needs following discharge   - Consider OT consult to assist with ADL evaluation and planning for discharge  - Provide patient education as appropriate  Outcome: Not Progressing  Goal: Maintains/Returns to pre admission functional level  Description: INTERVENTIONS:  - Perform BMAT or MOVE assessment daily    - Set and communicate daily mobility goal to care team and patient/family/caregiver     - Collaborate with rehabilitation services on mobility goals if consulted  - Perform Range of Motion 3 times a day  - Reposition patient every 2 hours    - Dangle patient 3 times a day  - Stand patient 3 times a day  - Ambulate patient 3 times a day  - Out of bed to chair 3 times a day   - Out of bed for meals 3 times a day  - Out of bed for toileting  - Record patient progress and toleration of activity level   Outcome: Not Progressing

## 2023-06-24 NOTE — PLAN OF CARE
Problem: Potential for Falls  Goal: Patient will remain free of falls  Description: INTERVENTIONS:  - Educate patient/family on patient safety including physical limitations  - Instruct patient to call for assistance with activity   - Consult OT/PT to assist with strengthening/mobility   - Keep Call bell within reach  - Keep bed low and locked with side rails adjusted as appropriate  - Keep care items and personal belongings within reach  - Initiate and maintain comfort rounds  - Make Fall Risk Sign visible to staff  - Offer Toileting every 2 Hours, in advance of need  - Initiate/Maintain bed/chair alarm  - Obtain necessary fall risk management equipment: bed/chair alarm  - Apply yellow socks and bracelet for high fall risk patients  - Consider moving patient to room near nurses station  Outcome: Progressing     Problem: MOBILITY - ADULT  Goal: Maintain or return to baseline ADL function  Description: INTERVENTIONS:  -  Assess patient's ability to carry out ADLs; assess patient's baseline for ADL function and identify physical deficits which impact ability to perform ADLs (bathing, care of mouth/teeth, toileting, grooming, dressing, etc )  - Assess/evaluate cause of self-care deficits   - Assess range of motion  - Assess patient's mobility; develop plan if impaired  - Assess patient's need for assistive devices and provide as appropriate  - Encourage maximum independence but intervene and supervise when necessary  - Involve family in performance of ADLs  - Assess for home care needs following discharge   - Consider OT consult to assist with ADL evaluation and planning for discharge  - Provide patient education as appropriate  Outcome: Progressing  Goal: Maintains/Returns to pre admission functional level  Description: INTERVENTIONS:  - Perform BMAT or MOVE assessment daily    - Set and communicate daily mobility goal to care team and patient/family/caregiver     - Collaborate with rehabilitation services on mobility goals if consulted  - Perform Range of Motion 3 times a day  - Reposition patient every 2 hours    - Dangle patient 3 times a day  - Stand patient 3 times a day  - Ambulate patient 3 times a day  - Out of bed to chair 3 times a day   - Out of bed for meals 3 times a day  - Out of bed for toileting  - Record patient progress and toleration of activity level   Outcome: Progressing     Problem: PAIN - ADULT  Goal: Verbalizes/displays adequate comfort level or baseline comfort level  Description: Interventions:  - Encourage patient to monitor pain and request assistance  - Assess pain using appropriate pain scale  - Administer analgesics based on type and severity of pain and evaluate response  - Implement non-pharmacological measures as appropriate and evaluate response  - Consider cultural and social influences on pain and pain management  - Notify physician/advanced practitioner if interventions unsuccessful or patient reports new pain  Outcome: Progressing     Problem: INFECTION - ADULT  Goal: Absence or prevention of progression during hospitalization  Description: INTERVENTIONS:  - Assess and monitor for signs and symptoms of infection  - Monitor lab/diagnostic results  - Monitor all insertion sites, i e  indwelling lines, tubes, and drains  - Monitor endotracheal if appropriate and nasal secretions for changes in amount and color  - [place] appropriate cooling/warming therapies per order  - Administer medications as ordered  - Instruct and encourage patient and family to use good hand hygiene technique  - Identify and instruct in appropriate isolation precautions for identified infection/condition  Outcome: Progressing  Goal: Absence of fever/infection during neutropenic period  Description: INTERVENTIONS:  - Monitor WBC    Outcome: Progressing     Problem: SAFETY ADULT  Goal: Patient will remain free of falls  Description: INTERVENTIONS:  - Educate patient/family on patient safety including physical limitations  - Instruct patient to call for assistance with activity   - Consult OT/PT to assist with strengthening/mobility   - Keep Call bell within reach  - Keep bed low and locked with side rails adjusted as appropriate  - Keep care items and personal belongings within reach  - Initiate and maintain comfort rounds  - Make Fall Risk Sign visible to staff  - Offer Toileting every 2 Hours, in advance of need  - Initiate/Maintain bed/chair alarm  - Obtain necessary fall risk management equipment: bed/chair alarm  - Apply yellow socks and bracelet for high fall risk patients  - Consider moving patient to room near nurses station  Outcome: Progressing  Goal: Maintain or return to baseline ADL function  Description: INTERVENTIONS:  -  Assess patient's ability to carry out ADLs; assess patient's baseline for ADL function and identify physical deficits which impact ability to perform ADLs (bathing, care of mouth/teeth, toileting, grooming, dressing, etc )  - Assess/evaluate cause of self-care deficits   - Assess range of motion  - Assess patient's mobility; develop plan if impaired  - Assess patient's need for assistive devices and provide as appropriate  - Encourage maximum independence but intervene and supervise when necessary  - Involve family in performance of ADLs  - Assess for home care needs following discharge   - Consider OT consult to assist with ADL evaluation and planning for discharge  - Provide patient education as appropriate  Outcome: Progressing  Goal: Maintains/Returns to pre admission functional level  Description: INTERVENTIONS:  - Perform BMAT or MOVE assessment daily    - Set and communicate daily mobility goal to care team and patient/family/caregiver  - Collaborate with rehabilitation services on mobility goals if consulted  - Perform Range of Motion 3 times a day  - Reposition patient every 2 hours    - Dangle patient 3 times a day  - Stand patient 3 times a day  - Ambulate patient 3 times a day  - Out of bed to chair 3 times a day   - Out of bed for meals 3 times a day  - Out of bed for toileting  - Record patient progress and toleration of activity level   Outcome: Progressing     Problem: Prexisting or High Potential for Compromised Skin Integrity  Goal: Skin integrity is maintained or improved  Description: INTERVENTIONS:  - Identify patients at risk for skin breakdown  - Assess and monitor skin integrity  - Assess and monitor nutrition and hydration status  - Monitor labs   - Assess for incontinence   - Turn and reposition patient  - Assist with mobility/ambulation  - Relieve pressure over bony prominences  - Avoid friction and shearing  - Provide appropriate hygiene as needed including keeping skin clean and dry  - Evaluate need for skin moisturizer/barrier cream  - Collaborate with interdisciplinary team   - Patient/family teaching  - Consider wound care consult   Outcome: Progressing

## 2023-06-24 NOTE — CASE MANAGEMENT
Case Management Discharge Planning Note    Patient name Rosette Chacon  Location [address] [phone] MRN [id number]  : 10/25/1929 Date 2023       Current Admission Date: 2023  Current Admission Diagnosis:Sepsis with acute hypoxic respiratory failure [facility])   Patient Active Problem List    Diagnosis Date Noted   • Sepsis with acute hypoxic respiratory failure ([facility] [address] ) 2023   • HTN (hypertension) 2023   • HLD (hyperlipidemia) 2023   • Alzheimer's dementia ([facility] [address] ) 2023   • Elevated troponin 2023   • Vomiting 2023   • Cirrhosis ([facility] [address] ) 2023   • Pain of left calf 2023   • Acute respiratory failure ([facility] [address] ) 2023   • Atherosclerotic heart disease 2023   • Hypothyroidism 2023      LOS (days): 3  Geometric Mean LOS (GMLOS) (days): 5 00  Days to GMLOS:2 2     OBJECTIVE:  Risk of Unplanned Readmission Score: 12 04         Current admission status: Inpatient   Preferred Pharmacy: No Pharmacies Listed  Primary Care Provider: Zofia Calzada MD    Primary Insurance: [facility] REP  Secondary Insurance: [address] DETAILS:    Cancelled transport to [facility], as 1 blood culture came back positive,  ID recommends repeating the culture and restarted patient on IV antibiotics  Informed Javon that patient is not being dc today via [facility]

## 2023-06-24 NOTE — ASSESSMENT & PLAN NOTE
Presents to ED with shortness of breath, hypoxia and vomiting  • SIRS met: SIRS: 2/4; tachycardia, tachypnea  • Source of infection: Possibly aspiration versus UTI  • She was recently treated for cellulitis however lower extremities do not appear cellulitic  • CT chest abdomen pelvis showing nonspecific peripheral groundglass opacities in the right upper lobe, was present previously however is now mildly increased, most likely chronic interstitial lung disease no focal consolidation, mild perivesical stranding suggestive of cystitis, no infectious interabdominal pathology  Lab Results   Component Value Date    WBC 6 87 06/22/2023    PROCALCITONI 1 52 (H) 06/22/2023    LACTICACID 2 1 ([place]) 06/21/2023       Patient blood culture is positive and urine culture positive, urine culture is showing Proteus Mirabella's  Blood cultures growing anaerobes, gram-negative rods as well as gram-positive cocci in pairs  Discussed the case over the phone with on-call infectious disease-recommending to switch antibiotic to ceftriaxone (even though in the chart listed patient is allergic to Ceftin-unknown type of reaction) and vancomycin  Follow repeat blood culture

## 2023-06-24 NOTE — PROGRESS NOTES
-- Patient:  -- MRN: [id number]  -- Aidin Request ID: [id number]  -- Level of care reserved: [facility]  -- [facility] [phone]  -- Clinical needs requested:  -- Geography searched: 10 miles around [phone]  -- Start of Service:  -- Request sent: 12:54pm EDT on 6/22/2023 by Avinash Lantigua  -- Partner reserved: 8:25am EDT on 6/24/2023 by Kathryn Fitzgerald  -- Choice list shared: 8:56am EDT on 6/23/2023 by Avinash Lantigua

## 2023-06-24 NOTE — PROGRESS NOTES
[address]  Progress Note  Name: Vidya Mora  MRN: [id number]  Unit/Bed#: -01 I Date of Admission: 6/21/2023   Date of Service: 6/24/2023 I Hospital Day: 3    Assessment/Plan   * Sepsis with acute hypoxic respiratory failure [facility])  Assessment & Plan  Presents to ED with shortness of breath, hypoxia and vomiting  • SIRS met: SIRS: 2/4; tachycardia, tachypnea  • Source of infection: Possibly aspiration versus UTI  • She was recently treated for cellulitis however lower extremities do not appear cellulitic  • CT chest abdomen pelvis showing nonspecific peripheral groundglass opacities in the right upper lobe, was present previously however is now mildly increased, most likely chronic interstitial lung disease no focal consolidation, mild perivesical stranding suggestive of cystitis, no infectious interabdominal pathology  Lab Results   Component Value Date    WBC 6 87 06/22/2023    PROCALCITONI 1 52 (H) 06/22/2023    LACTICACID 2 1 ([place]) 06/21/2023       Patient blood culture is positive and urine culture positive, urine culture is showing Proteus Mirabella's  Blood cultures growing anaerobes, gram-negative rods as well as gram-positive cocci in pairs  Discussed the case over the phone with on-call infectious disease-recommending to switch antibiotic to ceftriaxone (even though in the chart listed patient is allergic to Ceftin-unknown type of reaction) and vancomycin  Follow repeat blood culture          Hypothyroidism  Assessment & Plan  On levothyroxine, continue    Atherosclerotic heart disease  Assessment & Plan  Not maintained on statin  Takes 81 mg aspirin daily  Initial troponin 9, repeat 84  First EKG without significant ischemic changes, will repeat  Denies any chest pain or SOB    Acute respiratory failure [facility])  Assessment & Plan  Patient with no baseline oxygen needs reports the ER with shortness of breath and hypoxia at nursing home  Possibly in the setting of "aspiration given had episode of vomiting prior  CT -   \"   No acute pulmonary embolus      Few scattered groundglass and reticular opacities in the mid to lower lung fields are nonspecific  No focal consolidation or suspicious mass  This may be secondary to mild air trapping or scattered subsegmental atelectasis/scarring  Atypical/viral   infection not excluded  Underlying interstitial lung disease not excluded  \"  Improved from this standpoint   Patient is saturating well on room air             Pain of left calf  Assessment & Plan  Pain to palpation of left calf, no erythema or significant swelling  Patient is overall poor historian    Venous doppler - RIGHT LOWER LIMB LIMITED:  Evaluation shows no evidence of thrombus in the common femoral vein  Doppler evaluation shows a normal response to augmentation maneuvers  LEFT LOWER LIMB:  No evidence of acute or chronic deep vein thrombosis  No evidence of superficial thrombophlebitis noted  Doppler evaluation shows a normal response to augmentation maneuvers  Popliteal, posterior tibial and anterior tibial arterial Doppler waveform's are  monophasic           Cirrhosis ([address] )  Assessment & Plan  Patient has no history noted of cirrhosis in chart  On CT abdomen pelvis noted to have hepatic cirrhosis, no ascites mentioned on scan  Does have some abdominal distention, patient denies feeling distended    LFTs within normal limits  Monitor  No longer aggitated      Vomiting  Assessment & Plan  Noted to have vomiting at SNF, patient denies this  Was nauseous in the ER, resolved after Zofran  Continue as needed Zofran  CT abdomen pelvis negative for acute pathology  Aspiration precautions- resolved      Elevated troponin  Assessment & Plan  · Initial troponin 9, repeat is 84  · Likely demand in setting of sepsis and tachycardia, denies any chest pain   · Alert on telemetry  · Nitro as needed, continue aspirin per home regimen    Alzheimer's dementia [facility])  Assessment & " "Plan  · Continue Namenda  · She does currently at her baseline  · Patient's daughter states she has a history of \"wandering\", patient's room close to nursing station for close monitoring          Ventral Hernia  Asymptomatic, continue to monitor  VTE Pharmacologic Prophylaxis:   Moderate Risk (Score 3-4) - Pharmacological DVT Prophylaxis Ordered: heparin  Patient Centered Rounds: I performed bedside rounds with nursing staff today  Discussions with Specialists or Other Care Team Provider: ID over the phone    Education and Discussions with Family / Patient: Updated  (daughter) via phone  Total Time Spent on Date of Encounter in care of patient: 10 minutes This time was spent on one or more of the following: performing physical exam; counseling and coordination of care; obtaining or reviewing history; documenting in the medical record; reviewing/ordering tests, medications or procedures; communicating with other healthcare professionals and discussing with patient's family/caregivers  Current Length of Stay: 3 day(s)  Current Patient Status: Inpatient   Certification Statement: The patient will continue to require additional inpatient hospital stay due to To monitor above condition  Discharge Plan: Anticipate discharge in 48-72 hrs to rehab facility  Code Status: Level 3 - DNAR and DNI    Subjective:   Seen and evaluated and examined  Patient remained comfortable  Just woke up from sleep, able to follow simple command, seems confused this morning-only able to recall her name  Objective:     Vitals:   Temp (24hrs), Av 6 °F (36 4 °C), Min:97 3 °F (36 3 °C), Max:97 7 °F (36 5 °C)    Temp:  [97 3 °F (36 3 °C)-97 7 °F (36 5 °C)] 97 3 °F (36 3 °C)  HR:  [74-80] 74  Resp:  [16-18] 18  BP: (111-128)/(73-81) 111/73  SpO2:  [92 %-95 %] 93 %  Body mass index is 31 58 kg/m²  Input and Output Summary (last 24 hours):      Intake/Output Summary (Last 24 hours) at 2023 1152  Last " data filed at 6/24/2023 0334  Gross per 24 hour   Intake 450 ml   Output 1175 ml   Net -725 ml       Physical Exam:   Physical Exam  Vitals reviewed  Constitutional:       Appearance: Normal appearance  She is obese  She is not ill-appearing or diaphoretic  HENT:      Mouth/Throat:      Pharynx: Oropharynx is clear  No oropharyngeal exudate  Eyes:      Extraocular Movements: Extraocular movements intact  Conjunctiva/sclera: Conjunctivae normal       Pupils: Pupils are equal, round, and reactive to light  Cardiovascular:      Rate and Rhythm: Normal rate  Heart sounds: Normal heart sounds  No murmur heard  No friction rub  No gallop  Pulmonary:      Effort: Pulmonary effort is normal  No respiratory distress  Breath sounds: No stridor  No wheezing or rhonchi  Abdominal:      General: Abdomen is flat  Bowel sounds are normal  There is no distension  Palpations: There is no mass  Tenderness: There is no abdominal tenderness  Hernia: A hernia is present  Skin:     General: Skin is warm  Capillary Refill: Capillary refill takes less than 2 seconds  Coloration: Skin is not jaundiced or pale  Findings: No bruising or erythema  Neurological:      General: No focal deficit present  Mental Status: She is alert and oriented to person, place, and time  Cranial Nerves: No cranial nerve deficit  Sensory: No sensory deficit  Motor: No weakness        Coordination: Coordination normal          Additional Data:     Labs:  Results from last 7 days   Lab Units 06/22/23  0536   WBC Thousand/uL 6 87   HEMOGLOBIN g/dL 11 7   HEMATOCRIT % 36 4   PLATELETS Thousands/uL 131*   NEUTROS PCT % 70   LYMPHS PCT % 21   MONOS PCT % 8   EOS PCT % 1     Results from last 7 days   Lab Units 06/22/23  0536   SODIUM mmol/L 137   POTASSIUM mmol/L 3 8   CHLORIDE mmol/L 102   CO2 mmol/L 30   BUN mg/dL 14   CREATININE mg/dL 0 95   ANION GAP mmol/L 5   CALCIUM mg/dL 8 2* ALBUMIN g/dL 3 1*   TOTAL BILIRUBIN mg/dL 1 27*   ALK PHOS U/L 54   ALT U/L 20   AST U/L 25   GLUCOSE RANDOM mg/dL 106     Results from last 7 days   Lab Units 06/22/23  0536   INR  1 10             Results from last 7 days   Lab Units 06/22/23  0536 06/21/23  2155 06/21/23  1844 06/21/23  1622 06/21/23  1416   LACTIC ACID mmol/L  --  2 1* 2 1* 2 1* 2 2*   PROCALCITONIN ng/ml 1 52*  --   --   --  0 14       Lines/Drains:  Invasive Devices     Peripheral Intravenous Line  Duration           Peripheral IV 06/23/23 Dorsal (posterior); Right Forearm 1 day          Drain  Duration           External Urinary Catheter <1 day                      Imaging: No pertinent imaging reviewed  Recent Cultures (last 7 days):   Results from last 7 days   Lab Units 06/21/23  1439 06/21/23  1434 06/21/23  1416   BLOOD CULTURE  Anaerobe (Organism type)*  --  No Growth at 48 hrs     GRAM STAIN RESULT  Gram negative rods*  Gram positive cocci in pairs*  --   --    URINE CULTURE   --  >100,000 cfu/ml Proteus mirabilis*  --        Last 24 Hours Medication List:   Current Facility-Administered Medications   Medication Dose Route Frequency Provider Last Rate   • acetaminophen  650 mg Oral Q6H PRN Joey Staff, PA-C     • aluminum-magnesium hydroxide-simethicone  30 mL Oral Q6H PRN [facility] Staff, PA-C     • aspirin  81 mg Oral Daily Joey Staff, PA-C     • cefTRIAXone  1,000 mg Intravenous Q24H Phu Buckner MD     • vitamin B-12  1,000 mcg Oral Daily Joey Staff, PA-C     • heparin (porcine)  5,000 Units Subcutaneous [facility] Staff, PA-C     • levothyroxine  125 mcg Oral Early Morning Joey Staff, PA-C     • memantine  10 mg Oral BID Joey Staff, PA-C     • multivitamin-minerals  1 tablet Oral Daily Joey Staff, PA-C     • ondansetron  4 mg Intravenous Q6H PRN [facility] Staff, PA-C     • polyethylene glycol  17 g Oral BID Joey Staff, PA-C     • senna  1 tablet Oral Daily [facility] Staff, PA-C     • vancomycin  1,000 mg Intravenous Once Sonal Nolen MD          Today, Patient Was Seen By: Sonal Nolen MD    **Please Note: This note may have been constructed using a voice recognition system  **

## 2023-06-24 NOTE — CASE MANAGEMENT
Received message from [facility], who stated due to pt's plan, prior auth not required  Pt has nursing home plan, once pt admits back to home nursing home, a nurse practitioner assigned to building will do assessment  CM notified

## 2023-06-24 NOTE — ASSESSMENT & PLAN NOTE
"Patient with no baseline oxygen needs reports the ER with shortness of breath and hypoxia at nursing home  Possibly in the setting of aspiration given had episode of vomiting prior  CT -   \"   No acute pulmonary embolus      Few scattered groundglass and reticular opacities in the mid to lower lung fields are nonspecific  No focal consolidation or suspicious mass  This may be secondary to mild air trapping or scattered subsegmental atelectasis/scarring  Atypical/viral   infection not excluded  Underlying interstitial lung disease not excluded  \"  Improved from this standpoint   Patient is saturating well on room air           "

## 2023-06-25 LAB
ALBUMIN SERPL BCP-MCNC: 3.3 G/DL (ref 3.5–5)
ALP SERPL-CCNC: 64 U/L (ref 34–104)
ALT SERPL W P-5'-P-CCNC: 25 U/L (ref 7–52)
ANION GAP SERPL CALCULATED.3IONS-SCNC: 5 MMOL/L
AST SERPL W P-5'-P-CCNC: 22 U/L (ref 13–39)
BASOPHILS # BLD AUTO: 0.02 THOUSANDS/ÂΜL (ref 0–0.1)
BASOPHILS NFR BLD AUTO: 0 % (ref 0–1)
BILIRUB SERPL-MCNC: 0.79 MG/DL (ref 0.2–1)
BUN SERPL-MCNC: 11 MG/DL (ref 5–25)
CALCIUM ALBUM COR SERPL-MCNC: 9.1 MG/DL (ref 8.3–10.1)
CALCIUM SERPL-MCNC: 8.5 MG/DL (ref 8.4–10.2)
CHLORIDE SERPL-SCNC: 105 MMOL/L (ref 96–108)
CO2 SERPL-SCNC: 29 MMOL/L (ref 21–32)
CREAT SERPL-MCNC: 0.81 MG/DL (ref 0.6–1.3)
EOSINOPHIL # BLD AUTO: 0.43 THOUSAND/ÂΜL (ref 0–0.61)
EOSINOPHIL NFR BLD AUTO: 7 % (ref 0–6)
ERYTHROCYTE [DISTWIDTH] IN BLOOD BY AUTOMATED COUNT: 13.5 % (ref 11.6–15.1)
GFR SERPL CREATININE-BSD FRML MDRD: 62 ML/MIN/1.73SQ M
GLUCOSE SERPL-MCNC: 104 MG/DL (ref 65–140)
HCT VFR BLD AUTO: 37.7 % (ref 34.8–46.1)
HGB BLD-MCNC: 12 G/DL (ref 11.5–15.4)
IMM GRANULOCYTES # BLD AUTO: 0.05 THOUSAND/UL (ref 0–0.2)
IMM GRANULOCYTES NFR BLD AUTO: 1 % (ref 0–2)
LYMPHOCYTES # BLD AUTO: 2.27 THOUSANDS/ÂΜL (ref 0.6–4.47)
LYMPHOCYTES NFR BLD AUTO: 36 % (ref 14–44)
MCH RBC QN AUTO: 29.6 PG (ref 26.8–34.3)
MCHC RBC AUTO-ENTMCNC: 31.8 G/DL (ref 31.4–37.4)
MCV RBC AUTO: 93 FL (ref 82–98)
MONOCYTES # BLD AUTO: 0.44 THOUSAND/ÂΜL (ref 0.17–1.22)
MONOCYTES NFR BLD AUTO: 7 % (ref 4–12)
NEUTROPHILS # BLD AUTO: 3.17 THOUSANDS/ÂΜL (ref 1.85–7.62)
NEUTS SEG NFR BLD AUTO: 49 % (ref 43–75)
NRBC BLD AUTO-RTO: 0 /100 WBCS
PLATELET # BLD AUTO: 148 THOUSANDS/UL (ref 149–390)
PMV BLD AUTO: 9.3 FL (ref 8.9–12.7)
POTASSIUM SERPL-SCNC: 4 MMOL/L (ref 3.5–5.3)
PROT SERPL-MCNC: 6.5 G/DL (ref 6.4–8.4)
RBC # BLD AUTO: 4.06 MILLION/UL (ref 3.81–5.12)
SODIUM SERPL-SCNC: 139 MMOL/L (ref 135–147)
WBC # BLD AUTO: 6.38 THOUSAND/UL (ref 4.31–10.16)

## 2023-06-25 PROCEDURE — 99232 SBSQ HOSP IP/OBS MODERATE 35: CPT | Performed by: FAMILY MEDICINE

## 2023-06-25 PROCEDURE — 85025 COMPLETE CBC W/AUTO DIFF WBC: CPT | Performed by: FAMILY MEDICINE

## 2023-06-25 PROCEDURE — 80053 COMPREHEN METABOLIC PANEL: CPT | Performed by: FAMILY MEDICINE

## 2023-06-25 RX ADMIN — POLYETHYLENE GLYCOL 3350 17 G: 17 POWDER, FOR SOLUTION ORAL at 08:29

## 2023-06-25 RX ADMIN — MEMANTINE 10 MG: 10 TABLET ORAL at 08:29

## 2023-06-25 RX ADMIN — VANCOMYCIN HYDROCHLORIDE 1000 MG: 1 INJECTION, SOLUTION INTRAVENOUS at 06:32

## 2023-06-25 RX ADMIN — HEPARIN SODIUM 5000 UNITS: 5000 INJECTION INTRAVENOUS; SUBCUTANEOUS at 21:29

## 2023-06-25 RX ADMIN — CEFTRIAXONE 1000 MG: 1 INJECTION, SOLUTION INTRAVENOUS at 13:42

## 2023-06-25 RX ADMIN — SENNOSIDES 8.6 MG: 8.6 TABLET, FILM COATED ORAL at 08:29

## 2023-06-25 RX ADMIN — POLYETHYLENE GLYCOL 3350 17 G: 17 POWDER, FOR SOLUTION ORAL at 17:45

## 2023-06-25 RX ADMIN — HEPARIN SODIUM 5000 UNITS: 5000 INJECTION INTRAVENOUS; SUBCUTANEOUS at 13:40

## 2023-06-25 RX ADMIN — LEVOTHYROXINE SODIUM 125 MCG: 125 TABLET ORAL at 06:00

## 2023-06-25 RX ADMIN — ASPIRIN 81 MG CHEWABLE TABLET 81 MG: 81 TABLET CHEWABLE at 08:29

## 2023-06-25 RX ADMIN — HEPARIN SODIUM 5000 UNITS: 5000 INJECTION INTRAVENOUS; SUBCUTANEOUS at 06:00

## 2023-06-25 RX ADMIN — CYANOCOBALAMIN TAB 500 MCG 1000 MCG: 500 TAB at 08:29

## 2023-06-25 RX ADMIN — Medication 1 TABLET: at 08:29

## 2023-06-25 RX ADMIN — MEMANTINE 10 MG: 10 TABLET ORAL at 17:45

## 2023-06-25 RX ADMIN — ACETAMINOPHEN 650 MG: 325 TABLET ORAL at 06:04

## 2023-06-25 NOTE — ASSESSMENT & PLAN NOTE
Presents to ED with shortness of breath, hypoxia and vomiting  • SIRS met: SIRS: 2/4; tachycardia, tachypnea  • Source of infection: Possibly aspiration versus UTI  • She was recently treated for cellulitis however lower extremities do not appear cellulitic  • CT chest abdomen pelvis showing nonspecific peripheral groundglass opacities in the right upper lobe, was present previously however is now mildly increased, most likely chronic interstitial lung disease no focal consolidation, mild perivesical stranding suggestive of cystitis, no infectious interabdominal pathology  Lab Results   Component Value Date    WBC 6 38 06/25/2023    PROCALCITONI 1 52 (H) 06/22/2023    LACTICACID 2 1 ([place]) 06/21/2023       Patient blood culture is positive and urine culture positive, urine culture is showing Proteus Mirabella's  Blood cultures growing anaerobes, gram-negative rods as well as gram-positive cocci in pairs  Discussed the case over the phone with on-call infectious disease-recommending to switch antibiotic to ceftriaxone (even though in the chart listed patient is allergic to Ceftin-unknown type of reaction) and vancomycin  Plan is to continue current treatment till repeat blood culture available

## 2023-06-25 NOTE — PROGRESS NOTES
[address]  Progress Note  Name: Roger Bishop  MRN: [id number]  Unit/Bed#: -01 I Date of Admission: 6/21/2023   Date of Service: 6/25/2023 I Hospital Day: 4    Assessment/Plan   * Sepsis with acute hypoxic respiratory failure [facility])  Assessment & Plan  Presents to ED with shortness of breath, hypoxia and vomiting  • SIRS met: SIRS: 2/4; tachycardia, tachypnea  • Source of infection: Possibly aspiration versus UTI  • She was recently treated for cellulitis however lower extremities do not appear cellulitic  • CT chest abdomen pelvis showing nonspecific peripheral groundglass opacities in the right upper lobe, was present previously however is now mildly increased, most likely chronic interstitial lung disease no focal consolidation, mild perivesical stranding suggestive of cystitis, no infectious interabdominal pathology  Lab Results   Component Value Date    WBC 6 38 06/25/2023    PROCALCITONI 1 52 (H) 06/22/2023    LACTICACID 2 1 ([place]) 06/21/2023       Patient blood culture is positive and urine culture positive, urine culture is showing Proteus Mirabella's  Blood cultures growing anaerobes, gram-negative rods as well as gram-positive cocci in pairs  Discussed the case over the phone with on-call infectious disease-recommending to switch antibiotic to ceftriaxone (even though in the chart listed patient is allergic to Ceftin-unknown type of reaction) and vancomycin  Plan is to continue current treatment till repeat blood culture available  Ventral hernia  Assessment & Plan  Asymptomatic, continue to monitor      Hypothyroidism  Assessment & Plan  On levothyroxine, continue    Atherosclerotic heart disease  Assessment & Plan  Not maintained on statin  Takes 81 mg aspirin daily  Initial troponin 9, repeat 84  First EKG without significant ischemic changes, will repeat  Denies any chest pain or SOB    Acute respiratory failure [facility])  Assessment & Plan  Patient with no "baseline oxygen needs reports the ER with shortness of breath and hypoxia at nursing home  Possibly in the setting of aspiration given had episode of vomiting prior  CT -   \"   No acute pulmonary embolus      Few scattered groundglass and reticular opacities in the mid to lower lung fields are nonspecific  No focal consolidation or suspicious mass  This may be secondary to mild air trapping or scattered subsegmental atelectasis/scarring  Atypical/viral   infection not excluded  Underlying interstitial lung disease not excluded  \"  Improved from this standpoint   Patient is saturating well on room air             Pain of left calf  Assessment & Plan  Pain to palpation of left calf, no erythema or significant swelling  Patient is overall poor historian    Venous doppler - RIGHT LOWER LIMB LIMITED:  Evaluation shows no evidence of thrombus in the common femoral vein  Doppler evaluation shows a normal response to augmentation maneuvers  LEFT LOWER LIMB:  No evidence of acute or chronic deep vein thrombosis  No evidence of superficial thrombophlebitis noted  Doppler evaluation shows a normal response to augmentation maneuvers  Popliteal, posterior tibial and anterior tibial arterial Doppler waveform's are  monophasic           Cirrhosis ([address] )  Assessment & Plan  Patient has no history noted of cirrhosis in chart  On CT abdomen pelvis noted to have hepatic cirrhosis, no ascites mentioned on scan  Does have some abdominal distention, patient denies feeling distended    LFTs within normal limits  Monitor  No longer aggitated      Vomiting  Assessment & Plan  Noted to have vomiting at SNF, patient denies this  Was nauseous in the ER, resolved after Zofran  Continue as needed Zofran  CT abdomen pelvis negative for acute pathology  Aspiration precautions- resolved      Elevated troponin  Assessment & Plan  · Initial troponin 9, repeat is 84  · Likely demand in setting of sepsis and tachycardia, denies any chest pain " "  · Alert on telemetry  · Nitro as needed, continue aspirin per home regimen    Alzheimer's dementia ([facility] [address] )  Assessment & Plan  · Continue Namenda  · She does currently at her baseline  · Patient's daughter states she has a history of \"wandering\", patient's room close to nursing station for close monitoring               VTE Pharmacologic Prophylaxis:   Moderate Risk (Score 3-4) - Pharmacological DVT Prophylaxis Ordered: heparin  Patient Centered Rounds: I performed bedside rounds with nursing staff today  Discussions with Specialists or Other Care Team Provider: None    Education and Discussions with Family / Patient: Updated  (daughter) via phone  Total Time Spent on Date of Encounter in care of patient: 10 minutes This time was spent on one or more of the following: performing physical exam; counseling and coordination of care; obtaining or reviewing history; documenting in the medical record; reviewing/ordering tests, medications or procedures; communicating with other healthcare professionals and discussing with patient's family/caregivers  Current Length of Stay: 4 day(s)  Current Patient Status: Inpatient   Certification Statement: The patient will continue to require additional inpatient hospital stay due to To monitor above condition  Discharge Plan: Anticipate discharge in 24-48 hrs to rehab facility  Code Status: Level 3 - DNAR and DNI    Subjective:   Seen and evaluated during the rounds  Resting comfortably  Cooperative  No significant overnight issues  Objective:     Vitals:   Temp (24hrs), Av 8 °F (36 6 °C), Min:97 7 °F (36 5 °C), Max:97 9 °F (36 6 °C)    Temp:  [97 7 °F (36 5 °C)-97 9 °F (36 6 °C)] 97 7 °F (36 5 °C)  HR:  [68-77] 71  Resp:  [16-18] 18  BP: ()/(63-71) 156/63  SpO2:  [91 %-94 %] 94 %  Body mass index is 31 58 kg/m²  Input and Output Summary (last 24 hours):      Intake/Output Summary (Last 24 hours) at 2023 1038  Last data filed at " 6/25/2023 0514  Gross per 24 hour   Intake 120 ml   Output 867 9 ml   Net -747 9 ml       Physical Exam:   Physical Exam  Vitals and nursing note reviewed  Constitutional:       Appearance: She is not ill-appearing or diaphoretic  Comments: Pleasantly confused   HENT:      Mouth/Throat:      Mouth: Mucous membranes are moist       Pharynx: Oropharynx is clear  No oropharyngeal exudate  Eyes:      General: No scleral icterus  Left eye: No discharge  Extraocular Movements: Extraocular movements intact  Conjunctiva/sclera: Conjunctivae normal       Pupils: Pupils are equal, round, and reactive to light  Cardiovascular:      Rate and Rhythm: Normal rate  Heart sounds: No friction rub  No gallop  Pulmonary:      Effort: Pulmonary effort is normal  No respiratory distress  Breath sounds: No stridor  No wheezing  Abdominal:      General: Abdomen is flat  Bowel sounds are normal  There is no distension  Tenderness: There is no abdominal tenderness  Hernia: A hernia (ventral hernia) is present  Skin:     General: Skin is warm  Capillary Refill: Capillary refill takes less than 2 seconds  Coloration: Skin is not jaundiced or pale  Findings: No bruising or erythema  Neurological:      General: No focal deficit present  Mental Status: She is alert and oriented to person, place, and time  Cranial Nerves: No cranial nerve deficit  Sensory: No sensory deficit  Motor: No weakness        Coordination: Coordination normal          Additional Data:     Labs:  Results from last 7 days   Lab Units 06/25/23  0503   WBC Thousand/uL 6 38   HEMOGLOBIN g/dL 12 0   HEMATOCRIT % 37 7   PLATELETS Thousands/uL 148*   NEUTROS PCT % 49   LYMPHS PCT % 36   MONOS PCT % 7   EOS PCT % 7*     Results from last 7 days   Lab Units 06/25/23  0503   SODIUM mmol/L 139   POTASSIUM mmol/L 4 0   CHLORIDE mmol/L 105   CO2 mmol/L 29   BUN mg/dL 11   CREATININE mg/dL 0  81   ANION GAP mmol/L 5   CALCIUM mg/dL 8 5   ALBUMIN g/dL 3 3*   TOTAL BILIRUBIN mg/dL 0 79   ALK PHOS U/L 64   ALT U/L 25   AST U/L 22   GLUCOSE RANDOM mg/dL 104     Results from last 7 days   Lab Units 06/22/23  0536   INR  1 10             Results from last 7 days   Lab Units 06/22/23  0536 06/21/23  2155 06/21/23  1844 06/21/23  1622 06/21/23  1416   LACTIC ACID mmol/L  --  2 1* 2 1* 2 1* 2 2*   PROCALCITONIN ng/ml 1 52*  --   --   --  0 14       Lines/Drains:  Invasive Devices     Peripheral Intravenous Line  Duration           Peripheral IV 06/24/23 Right;Ventral (anterior) Forearm <1 day          Drain  Duration           External Urinary Catheter 1 day                      Imaging: No pertinent imaging reviewed  Recent Cultures (last 7 days):   Results from last 7 days   Lab Units 06/24/23  1429 06/24/23  1405 06/21/23  1439 06/21/23  1434 06/21/23  1416   BLOOD CULTURE  Received in Microbiology Lab  Culture in Progress  Received in Microbiology Lab  Culture in Progress  Anaerobe (Organism type)*  --  No Growth at 72 hrs     GRAM STAIN RESULT   --   --  Gram negative rods*  Gram positive cocci in pairs*  --   --    URINE CULTURE   --   --   --  >100,000 cfu/ml Proteus mirabilis*  --        Last 24 Hours Medication List:   Current Facility-Administered Medications   Medication Dose Route Frequency Provider Last Rate   • acetaminophen  650 mg Oral Q6H PRN Erin Push, PA-C     • aluminum-magnesium hydroxide-simethicone  30 mL Oral Q6H PRN Erin Push, PA-C     • aspirin  81 mg Oral Daily Erin Push, PA-C     • cefTRIAXone  1,000 mg Intravenous Q24H Bo Darden MD 1,000 mg (06/24/23 1447)   • vitamin B-12  1,000 mcg Oral Daily Erin Push, PA-C     • heparin (porcine)  5,000 Units Subcutaneous [facility] Erin Push, PA-C     • levothyroxine  125 mcg Oral Early Morning Erin Push, PA-C     • memantine  10 mg Oral BID Erin Push, PA-C     • multivitamin-minerals  1 tablet Oral Daily Rhea Russell PA-C     • ondansetron  4 mg Intravenous Q6H PRN Rhea Russell PA-C     • polyethylene glycol  17 g Oral BID Rhea Russell PA-C     • senna  1 tablet Oral Daily Rhea Russell PA-C     • [START ON 6/26/2023] vancomycin  1,250 mg Intravenous Q24H Megan Vickers MD          Today, Patient Was Seen By: Megan Vickers MD    **Please Note: This note may have been constructed using a voice recognition system  **

## 2023-06-25 NOTE — PLAN OF CARE
Problem: PAIN - ADULT  Goal: Verbalizes/displays adequate comfort level or baseline comfort level  Description: Interventions:  - Encourage patient to monitor pain and request assistance  - Assess pain using appropriate pain scale  - Administer analgesics based on type and severity of pain and evaluate response  - Implement non-pharmacological measures as appropriate and evaluate response  - Consider cultural and social influences on pain and pain management  - Notify physician/advanced practitioner if interventions unsuccessful or patient reports new pain  [id number] by Damaso Badillo RN  Outcome: Progressing  [id number] by Damaso Badillo RN  Outcome: Progressing

## 2023-06-25 NOTE — PROGRESS NOTES
Zeynep Morrow is a 80 y o  female who is currently ordered Vancomycin IV with management by the Pharmacy Consult service  Relevant clinical data and objective / subjective history reviewed  Vancomycin Assessment:  Indication and Goal AUC/Trough: Pneumonia (goal -600, trough >10); Urinary tract infection (goal -600, trough >10); Bacteremia (goal -600, trough >10)  Clinical Status: stable  Micro:     Renal Function:  SCr: 0 81 mg/dL  CrCl: 45 3 mL/min  Renal replacement: Not on dialysis  Days of Therapy: 5  Current Dose: 1000 mg IV every 24 hours  Vancomycin Plan:  New Dosin mg IV every 24 hours  Estimated AUC: 499 mcg*hr/mL  Estimated Trough: 15 3 mcg/mL  Next Level: 23 @ 0600  Renal Function Monitoring: Daily BMP and [place] will continue to follow closely for s/sx of nephrotoxicity, infusion reactions and appropriateness of therapy  BMP and CBC will be ordered per protocol  We will continue to follow the patient’s culture results and clinical progress daily      Tati Clemens, Pharmacist

## 2023-06-25 NOTE — PLAN OF CARE
Problem: Potential for Falls  Goal: Patient will remain free of falls  Description: INTERVENTIONS:  - Educate patient/family on patient safety including physical limitations  - Instruct patient to call for assistance with activity   - Consult OT/PT to assist with strengthening/mobility   - Keep Call bell within reach  - Keep bed low and locked with side rails adjusted as appropriate  - Keep care items and personal belongings within reach  - Initiate and maintain comfort rounds  - Make Fall Risk Sign visible to staff  - Offer Toileting every 2 Hours, in advance of need  - Initiate/Maintain bed/chair alarm  - Obtain necessary fall risk management equipment: bed/chair alarm  - Apply yellow socks and bracelet for high fall risk patients  - Consider moving patient to room near nurses station  Outcome: Not Progressing

## 2023-06-25 NOTE — PLAN OF CARE
Problem: PAIN - ADULT  Goal: Verbalizes/displays adequate comfort level or baseline comfort level  Description: Interventions:  - Encourage patient to monitor pain and request assistance  - Assess pain using appropriate pain scale  - Administer analgesics based on type and severity of pain and evaluate response  - Implement non-pharmacological measures as appropriate and evaluate response  - Consider cultural and social influences on pain and pain management  - Notify physician/advanced practitioner if interventions unsuccessful or patient reports new pain  Outcome: Progressing     Problem: INFECTION - ADULT  Goal: Absence or prevention of progression during hospitalization  Description: INTERVENTIONS:  - Assess and monitor for signs and symptoms of infection  - Monitor lab/diagnostic results  - Monitor all insertion sites, i e  indwelling lines, tubes, and drains  - Monitor endotracheal if appropriate and nasal secretions for changes in amount and color  - [place] appropriate cooling/warming therapies per order  - Administer medications as ordered  - Instruct and encourage patient and family to use good hand hygiene technique  - Identify and instruct in appropriate isolation precautions for identified infection/condition  Outcome: Progressing  Goal: Absence of fever/infection during neutropenic period  Description: INTERVENTIONS:  - Monitor WBC    Outcome: Progressing

## 2023-06-26 VITALS
SYSTOLIC BLOOD PRESSURE: 151 MMHG | DIASTOLIC BLOOD PRESSURE: 74 MMHG | HEART RATE: 75 BPM | TEMPERATURE: 97.7 F | OXYGEN SATURATION: 96 % | HEIGHT: 64 IN | WEIGHT: 184 LBS | RESPIRATION RATE: 19 BRPM | BODY MASS INDEX: 31.41 KG/M2

## 2023-06-26 PROBLEM — R78.81 BACTEREMIA: Status: ACTIVE | Noted: 2023-06-26

## 2023-06-26 PROBLEM — R11.10 VOMITING: Status: RESOLVED | Noted: 2023-06-21 | Resolved: 2023-06-26

## 2023-06-26 LAB
ALBUMIN SERPL BCP-MCNC: 3.6 G/DL (ref 3.5–5)
ALP SERPL-CCNC: 68 U/L (ref 34–104)
ALT SERPL W P-5'-P-CCNC: 28 U/L (ref 7–52)
ANION GAP SERPL CALCULATED.3IONS-SCNC: 5 MMOL/L
AST SERPL W P-5'-P-CCNC: 27 U/L (ref 13–39)
BASOPHILS # BLD AUTO: 0.04 THOUSANDS/ÂΜL (ref 0–0.1)
BASOPHILS NFR BLD AUTO: 1 % (ref 0–1)
BILIRUB SERPL-MCNC: 0.7 MG/DL (ref 0.2–1)
BUN SERPL-MCNC: 11 MG/DL (ref 5–25)
CALCIUM SERPL-MCNC: 9 MG/DL (ref 8.4–10.2)
CHLORIDE SERPL-SCNC: 106 MMOL/L (ref 96–108)
CO2 SERPL-SCNC: 29 MMOL/L (ref 21–32)
CREAT SERPL-MCNC: 0.81 MG/DL (ref 0.6–1.3)
EOSINOPHIL # BLD AUTO: 0.47 THOUSAND/ÂΜL (ref 0–0.61)
EOSINOPHIL NFR BLD AUTO: 7 % (ref 0–6)
ERYTHROCYTE [DISTWIDTH] IN BLOOD BY AUTOMATED COUNT: 13.6 % (ref 11.6–15.1)
GFR SERPL CREATININE-BSD FRML MDRD: 62 ML/MIN/1.73SQ M
GLUCOSE SERPL-MCNC: 118 MG/DL (ref 65–140)
HCT VFR BLD AUTO: 41.3 % (ref 34.8–46.1)
HGB BLD-MCNC: 13.1 G/DL (ref 11.5–15.4)
IMM GRANULOCYTES # BLD AUTO: 0.04 THOUSAND/UL (ref 0–0.2)
IMM GRANULOCYTES NFR BLD AUTO: 1 % (ref 0–2)
LYMPHOCYTES # BLD AUTO: 2.17 THOUSANDS/ÂΜL (ref 0.6–4.47)
LYMPHOCYTES NFR BLD AUTO: 32 % (ref 14–44)
MCH RBC QN AUTO: 29.4 PG (ref 26.8–34.3)
MCHC RBC AUTO-ENTMCNC: 31.7 G/DL (ref 31.4–37.4)
MCV RBC AUTO: 93 FL (ref 82–98)
MONOCYTES # BLD AUTO: 0.51 THOUSAND/ÂΜL (ref 0.17–1.22)
MONOCYTES NFR BLD AUTO: 8 % (ref 4–12)
NEUTROPHILS # BLD AUTO: 3.47 THOUSANDS/ÂΜL (ref 1.85–7.62)
NEUTS SEG NFR BLD AUTO: 51 % (ref 43–75)
NRBC BLD AUTO-RTO: 0 /100 WBCS
PLATELET # BLD AUTO: 158 THOUSANDS/UL (ref 149–390)
PMV BLD AUTO: 9 FL (ref 8.9–12.7)
POTASSIUM SERPL-SCNC: 4.1 MMOL/L (ref 3.5–5.3)
PROT SERPL-MCNC: 7.2 G/DL (ref 6.4–8.4)
RBC # BLD AUTO: 4.46 MILLION/UL (ref 3.81–5.12)
SODIUM SERPL-SCNC: 140 MMOL/L (ref 135–147)
WBC # BLD AUTO: 6.7 THOUSAND/UL (ref 4.31–10.16)

## 2023-06-26 PROCEDURE — 99239 HOSP IP/OBS DSCHRG MGMT >30: CPT | Performed by: FAMILY MEDICINE

## 2023-06-26 PROCEDURE — 85025 COMPLETE CBC W/AUTO DIFF WBC: CPT | Performed by: FAMILY MEDICINE

## 2023-06-26 PROCEDURE — 80053 COMPREHEN METABOLIC PANEL: CPT | Performed by: FAMILY MEDICINE

## 2023-06-26 PROCEDURE — NC001 PR NO CHARGE: Performed by: INTERNAL MEDICINE

## 2023-06-26 RX ORDER — METRONIDAZOLE 500 MG/1
500 TABLET ORAL EVERY 8 HOURS SCHEDULED
Qty: 21 TABLET | Refills: 0 | Status: SHIPPED
Start: 2023-06-26 | End: 2023-07-03

## 2023-06-26 RX ADMIN — HEPARIN SODIUM 5000 UNITS: 5000 INJECTION INTRAVENOUS; SUBCUTANEOUS at 05:37

## 2023-06-26 RX ADMIN — SENNOSIDES 8.6 MG: 8.6 TABLET, FILM COATED ORAL at 08:58

## 2023-06-26 RX ADMIN — ASPIRIN 81 MG CHEWABLE TABLET 81 MG: 81 TABLET CHEWABLE at 08:58

## 2023-06-26 RX ADMIN — Medication 1 TABLET: at 08:58

## 2023-06-26 RX ADMIN — MEMANTINE 10 MG: 10 TABLET ORAL at 08:58

## 2023-06-26 RX ADMIN — VANCOMYCIN HYDROCHLORIDE 1250 MG: 5 INJECTION, POWDER, LYOPHILIZED, FOR SOLUTION INTRAVENOUS at 05:37

## 2023-06-26 RX ADMIN — LEVOTHYROXINE SODIUM 125 MCG: 125 TABLET ORAL at 05:37

## 2023-06-26 RX ADMIN — POLYETHYLENE GLYCOL 3350 17 G: 17 POWDER, FOR SOLUTION ORAL at 08:58

## 2023-06-26 RX ADMIN — CYANOCOBALAMIN TAB 500 MCG 1000 MCG: 500 TAB at 08:58

## 2023-06-26 NOTE — DISCHARGE SUMMARY
[address]  Discharge- Deniz Lincoln 10/25/1929, 80 y o  female MRN: [id number]  Unit/Bed#: [facility] Encounter: [id number]  Primary Care Provider: Colleen Gallardo MD   Date and time admitted to hospital: 6/21/2023  1:55 PM    Sepsis with acute hypoxic respiratory failure [facility])  Assessment & Plan  Presents to ED with shortness of breath, hypoxia and vomiting  • SIRS met: SIRS: 2/4; tachycardia, tachypnea  • Source of infection: Possibly aspiration versus UTI  • She was recently treated for cellulitis however lower extremities do not appear cellulitic  • CT chest abdomen pelvis showing nonspecific peripheral groundglass opacities in the right upper lobe, was present previously however is now mildly increased, most likely chronic interstitial lung disease no focal consolidation, mild perivesical stranding suggestive of cystitis, no infectious interabdominal pathology  Lab Results   Component Value Date    WBC 6 70 06/26/2023    PROCALCITONI 1 52 (H) 06/22/2023    LACTICACID 2 1 ([place]) 06/21/2023       Patient received adequate amount of IV antibiotic  Urine culture showing Proteus mirabilis, blood culture showing anaerobes as well as gram-positive cocci-repeat blood culture shows no growth in 24 hours  ID consult appreciated,-recommending to continue metronidazole 500 mg 3 times daily for 7 days and patient can be discharged to rehab  Patient is discharged back to rehab  * Bacteremia  Assessment & Plan  Anaerobic bacteremia  Blood culture shows no growth  Received IV antibiotic with trazodone and vancomycin  Will be discharged with p o  metronidazole for 7 days  ID consult appreciated  Ventral hernia  Assessment & Plan  Asymptomatic, continue to monitor      Hypothyroidism  Assessment & Plan  On levothyroxine, continue    Atherosclerotic heart disease  Assessment & Plan  Not maintained on statin  Takes 81 mg aspirin daily  Initial troponin 9, repeat 84  First EKG without "significant ischemic changes, will repeat  Denies any chest pain or SOB    Acute respiratory failure ([facility] [address] )  Assessment & Plan  Patient with no baseline oxygen needs reports the ER with shortness of breath and hypoxia at nursing home  Possibly in the setting of aspiration given had episode of vomiting prior  CT -   \"   No acute pulmonary embolus      Few scattered groundglass and reticular opacities in the mid to lower lung fields are nonspecific  No focal consolidation or suspicious mass  This may be secondary to mild air trapping or scattered subsegmental atelectasis/scarring  Atypical/viral   infection not excluded  Underlying interstitial lung disease not excluded  \"  Improved from this standpoint   Patient is saturating well on room air             Pain of left calf  Assessment & Plan  Pain to palpation of left calf, no erythema or significant swelling  Patient is overall poor historian    Venous doppler - RIGHT LOWER LIMB LIMITED:  Evaluation shows no evidence of thrombus in the common femoral vein  Doppler evaluation shows a normal response to augmentation maneuvers  LEFT LOWER LIMB:  No evidence of acute or chronic deep vein thrombosis  No evidence of superficial thrombophlebitis noted  Doppler evaluation shows a normal response to augmentation maneuvers  Popliteal, posterior tibial and anterior tibial arterial Doppler waveform's are  monophasic           Cirrhosis ([facility] [address] )  Assessment & Plan  Patient has no history noted of cirrhosis in chart  On CT abdomen pelvis noted to have hepatic cirrhosis, no ascites mentioned on scan  Does have some abdominal distention, patient denies feeling distended    LFTs within normal limits  Monitor  No longer aggitated      Elevated troponin  Assessment & Plan  · Initial troponin 9, repeat is 84  · Likely demand in setting of sepsis and tachycardia, denies any chest pain   · Alert on telemetry  · Nitro as needed, continue aspirin per home regimen    Alzheimer's dementia " "(HCC)  Assessment & Plan  · Continue Namenda  · She does currently at her baseline  · Patient's daughter states she has a history of \"wandering\", patient's room close to nursing station for close monitoring    Vomiting-resolved as of 6/26/2023  Assessment & Plan  Noted to have vomiting at SNF, patient denies this  Was nauseous in the ER, resolved after Zofran  Continue as needed Zofran  CT abdomen pelvis negative for acute pathology  Aspiration precautions- resolved          Medical Problems     Resolved Problems  Date Reviewed: 6/23/2023          Resolved    HTN (hypertension) 6/24/2023     Resolved by  Kat Garcia MD    HLD (hyperlipidemia) 6/24/2023     Resolved by  Kat Garcia MD    Vomiting 6/26/2023     Resolved by  Kat Garcia MD        Discharging Physician / Practitioner: Kat Garcia MD  PCP: Viviana Love MD  Admission Date:   Admission Orders (From admission, onward)     Ordered        06/21/23 [facility]  Once                      Discharge Date: 06/26/23    Consultations During Hospital Stay:  · Infectious disease  · PT/OT    Procedures Performed:   VAS lower limb venous duplex study, unilateral/limited   Final Result by Cy Mathew DO (06/22 0543)      CTA chest pe study   Final Result by Leland Mcbride DO (06/21 2235)      No acute pulmonary embolus  Few scattered groundglass and reticular opacities in the mid to lower lung fields are nonspecific  No focal consolidation or suspicious mass  This may be secondary to mild air trapping or scattered subsegmental atelectasis/scarring  Atypical/viral    infection not excluded  Underlying interstitial lung disease not excluded  Workstation performed: [id number]         CT head without contrast   Final Result by Lee Chan MD (06/21 1826)      No acute intracranial abnormality  Chronic microangiopathic changes                    Workstation performed: [id number]         CT chest " abdomen pelvis wo contrast   Final Result by Akosua Larkin MD (06/21 1636)      1  Nonspecific peripheral groundglass opacities, particularly in the right upper lobe, present previously though mildly increased  This may represent chronic interstitial lung disease  No focal consolidation  2   Mild perivesical stranding suggestive of cystitis  3   Hepatic cirrhosis  4   Cholelithiasis  5   Pancreatic cyst, new or increased from 2019  This is of unlikely clinical significance in a patient of this age though would be better evaluated with contrast-enhanced MRI/MRCP  Workstation performed: [id number]         XR chest portable   Final Result by Omid Peter MD (06/21 1612)      Mild cardiomegaly      Mild vascular congestion      Small right pleural effusion                  Workstation performed: [id number]         ·   VAS lower limb venous duplex study, unilateral/limited    RIGHT LOWER LIMB LIMITED:  Evaluation shows no evidence of thrombus in the common femoral vein  Doppler evaluation shows a normal response to augmentation maneuvers  LEFT LOWER LIMB:  No evidence of acute or chronic deep vein thrombosis  No evidence of superficial thrombophlebitis noted  Doppler evaluation shows a normal response to augmentation maneuvers  Popliteal, posterior tibial and anterior tibial arterial Doppler waveform's are  monophasic      Significant Findings / Test Results:   Lab Results   Component Value Date    WBC 6 70 06/26/2023    HGB 13 1 06/26/2023    HCT 41 3 06/26/2023    MCV 93 06/26/2023     06/26/2023   ·   Lab Results   Component Value Date    SODIUM 140 06/26/2023    K 4 1 06/26/2023     06/26/2023    CO2 29 06/26/2023    AGAP 5 06/26/2023    BUN 11 06/26/2023    CREATININE 0 81 06/26/2023    GLUC 118 06/26/2023    CALCIUM 9 0 06/26/2023    AST 27 06/26/2023    ALT 28 06/26/2023    ALKPHOS 68 06/26/2023    TP 7 2 06/26/2023    TBILI 0 70 06/26/2023 EGFR 62 06/26/2023   ·   Collected Updated Procedure Result Status Patient Facility Result Comment    06/24/2023 1429 06/25/2023 2301 Blood culture [[id number]]   Blood from Arm, Right    Preliminary result [address]  Component Value   Blood Culture No Growth at 24 hrs  P             06/24/2023 1405 06/25/2023 2301 Blood culture [[id number]]   Blood from Arm, Left    Preliminary result [address]  Component Value   Blood Culture No Growth at 24 hrs  P             06/22/2023 1310 06/22/2023 1354 COVID/FLU/RSV [[id number]]    Nares from Nose    Final result [facility] PATIENTS - copy/paste COVID Guidelines URL to browser: [email or web address]   SARS-CoV-2 assay is a Nucleic Acid Amplification assay intended for the   qualitative detection of nucleic acid from SARS-CoV-2 in nasopharyngeal   swabs  Results are for the presumptive identification of SARS-CoV-2 RNA  Positive results are indicative of infection with SARS-CoV-2, the virus   causing COVID-19, but do not rule out bacterial infection or co-infection   with other viruses  Laboratories within the [place] and its   territories are required to report all positive results to the appropriate   public health authorities  Negative results do not preclude SARS-CoV-2   infection and should not be used as the sole basis for treatment or other   patient management decisions  Negative results must be combined with   clinical observations, patient history, and epidemiological information  This test has not been FDA cleared or approved  This test has been authorized by FDA under an Emergency Use Authorization   (EUA)   This test is only authorized for the duration of time the   declaration that circumstances exist justifying the authorization of the   emergency use of an in vitro diagnostic tests for detection of SARS-CoV-2   virus and/or diagnosis of COVID-19 infection under section 564(b)(1) of   the Act, 21 U  S C  019VSK-0(E)(7), unless the authorization is terminated   or revoked sooner  The test has been validated but independent review by FDA   and CLIA is pending  Test performed using [employer] GeneXpert: This RT-PCR assay targets N2,   a region unique to SARS-CoV-2  A conserved region in the E-gene was chosen   for pan-Sarbecovirus detection which includes SARS-CoV-2  According to CMS-2020-01-R, this platform meets the definition of high-[employer] technology  Component Value   SARS-CoV-2 Negative   INFLUENZA A PCR Negative   INFLUENZA B PCR Negative   RSV PCR Negative          06/21/2023 2155 06/23/2023 1109 MRSA culture [[id number]]   Nares from Nose    Final result [address]  Component Value   MRSA Culture Only No Methicillin Resistant Staphlyococcus aureus (MRSA) isolated             06/21/2023 1439 06/25/2023 1521 Blood culture #1 [[id number]]   (Abnormal)   Blood from Hand, Right    Preliminary result [address]  Component Value   Blood Culture Clostridium innocuum Abnormal  P    This organism has been edited  The previous result was Anaerobe (Organism type) on 6/25/2023 at 1117 EDT  Parabacteroides distasonis Abnormal  P   Gram Stain Result Gram negative rods Abnormal  P    This is an appended report  These results have been appended to a previously preliminary verified report  Gram positive cocci in pairs Abnormal  P    This is an appended report  These results have been appended to a previously preliminary verified report              06/21/2023 1439 06/23/2023 1853 Blood Culture Identification Panel [[id number]]   Blood from Hand, Right    Preliminary result [address]  Component Value   ALL TARGETS Not Detected P    Unable to identify organism by Film Array Panel   Please follow up with routine culture and susceptibility results  06/21/2023 1434 06/23/2023 1607 Urine culture [[id number]]    (Abnormal)   Urine, Straight Cath    Final result [address]  Component Value   Urine Culture >100,000 cfu/ml Proteus mirabilis Abnormal     This organism has been edited  The previous result was Proteus species on 6/22/2023 at 1638 EDT  Susceptibility    Proteus mirabilis (1)    Antibiotic Interpretation Microscan  Method Status    ZID Performed  Yes  MARILU Final    Ampicillin ($$) Susceptible <=8 00 ug/ml MARILU Final    Aztreonam ($$$)  Susceptible <=4 ug/ml MARILU Final    Cefazolin ($) Susceptible 4 00 ug/ml MARILU Final    Ciprofloxacin ($)  Susceptible <=0 25 ug/ml MARILU Final    Gentamicin ($$) Susceptible <=2 ug/ml MARILU Final    Levofloxacin ($) Susceptible <=0 50 ug/ml MARILU Final    Nitrofurantoin Resistant 64 ug/ml MARILU Final    Tetracycline Resistant >8 ug/ml MARILU Final    Tobramycin ($) Susceptible <=2 ug/ml MARILU Final    Trimethoprim + Sulfamethoxazole ($$$) Susceptible <=0 5/9 5 ug/ml MARILU Final          06/21/2023 1416 06/26/2023 0001 Blood culture #2 [[id number]]   Blood from Arm, Right    Preliminary result [address]  Component Value   Blood Culture No Growth After 4 Days  P               ·     Incidental Findings:   · As mentioned above  · I reviewed the above mentioned incidental findings with the patient and/or family and they expressed understanding  Test Results Pending at Discharge (will require follow up):   · none     Outpatient Tests Requested:  · none    Complications: None    Reason for Admission: Shortness of breath    Hospital Course:   Ashlyn Castillo is a 80 y o  female patient who originally presented to the hospital on 6/21/2023 due to shortness of breath  Patient admitted under diagnosis of sepsis with acute respiratory failure  Later on blood culture grow shows anaerobic bacteremia    Patient received IV antibiotic with ceftriaxone and "vancomycin with improvement  Infectious disease consulted, recommending to be discharged with p o  metronidazole for another 7 days  With the treatment, patient condition significantly improved  Patient remain off of oxygen  Venous duplex negative  Acuity recommendation appreciated  Remained hemodynamically stable and back to her baseline  All lab results, imaging findings, treatment plan and option discussed in details with patient's daughter over the phone  Verbalizes to understand and agrees  Patient is [facility] back to [facility]  Please see above list of diagnoses and related plan for additional information  Condition at Discharge: stable    Discharge Day Visit / Exam:   Subjective: Seen and evaluated and examined  Resting comfortably  Denies any significant complaint  Vitals: Blood Pressure: 151/74 (06/26/23 0732)  Pulse: 75 (06/26/23 0732)  Temperature: 97 7 °F (36 5 °C) (06/26/23 0732)  Temp Source: Temporal (06/22/23 1900)  Respirations: 19 (06/26/23 0732)  Height: 5' 4\" (162 6 cm) (06/21/23 1836)  Weight - Scale: 83 5 kg (184 lb) (06/21/23 1836)  SpO2: 96 % (06/26/23 0732)  Exam:   Physical Exam  Vitals and nursing note reviewed  Constitutional:       Appearance: Normal appearance  She is not ill-appearing or diaphoretic  HENT:      Mouth/Throat:      Mouth: Mucous membranes are moist       Pharynx: Oropharynx is clear  No oropharyngeal exudate  Eyes:      General:         Left eye: No discharge  Extraocular Movements: Extraocular movements intact  Conjunctiva/sclera: Conjunctivae normal       Pupils: Pupils are equal, round, and reactive to light  Cardiovascular:      Rate and Rhythm: Normal rate  Heart sounds: Normal heart sounds  No murmur heard  No friction rub  No gallop  Pulmonary:      Effort: Pulmonary effort is normal  No respiratory distress  Breath sounds: No stridor  No wheezing or rhonchi     Abdominal:      General: Abdomen is " flat  Bowel sounds are normal  There is no distension  Palpations: There is no mass  Tenderness: There is no abdominal tenderness  Hernia: No hernia is present  Musculoskeletal:      Cervical back: Normal range of motion  Right lower leg: No edema  Left lower leg: No edema  Skin:     General: Skin is warm  Capillary Refill: Capillary refill takes less than 2 seconds  Coloration: Skin is not jaundiced or pale  Findings: No bruising or erythema  Neurological:      Mental Status: She is alert and oriented to person, place, and time  Mental status is at baseline  Cranial Nerves: No cranial nerve deficit  Sensory: No sensory deficit  Motor: No weakness  Coordination: Coordination normal          Discussion with Family: Updated  (daughter) via phone  Discharge instructions/Information to patient and family:   See after visit summary for information provided to patient and family  Provisions for Follow-Up Care:  See after visit summary for information related to follow-up care and any pertinent home health orders  Disposition:   Delfina Pandey at [facility]    Planned Readmission: If condition get worse     Discharge Statement:  Greater than 50% of the total time was spent examining patient, answering all patient questions, arranging and discussing plan of care with patient as well as directly providing post-discharge instructions  Additional time then spent on discharge activities  Discharge Medications:  See after visit summary for reconciled discharge medications provided to patient and/or family        **Please Note: This note may have been constructed using a voice recognition system**

## 2023-06-26 NOTE — ASSESSMENT & PLAN NOTE
Presents to ED with shortness of breath, hypoxia and vomiting  • SIRS met: SIRS: 2/4; tachycardia, tachypnea  • Source of infection: Possibly aspiration versus UTI  • She was recently treated for cellulitis however lower extremities do not appear cellulitic  • CT chest abdomen pelvis showing nonspecific peripheral groundglass opacities in the right upper lobe, was present previously however is now mildly increased, most likely chronic interstitial lung disease no focal consolidation, mild perivesical stranding suggestive of cystitis, no infectious interabdominal pathology  Lab Results   Component Value Date    WBC 6 70 06/26/2023    PROCALCITONI 1 52 (H) 06/22/2023    LACTICACID 2 1 ([place]) 06/21/2023       Patient received adequate amount of IV antibiotic  Urine culture showing Proteus mirabilis, blood culture showing anaerobes as well as gram-positive cocci-repeat blood culture shows no growth in 24 hours  ID consult appreciated,-recommending to continue metronidazole 500 mg 3 times daily for 7 days and patient can be discharged to rehab  Patient is discharged back to rehab

## 2023-06-26 NOTE — CASE MANAGEMENT
Case Management Discharge Planning Note    Patient name Sophia Barnett  Location /-08 MRN [id number]  : 10/25/1929 Date 2023       Current Admission Date: 2023  Current Admission Diagnosis:Bacteremia   Patient Active Problem List    Diagnosis Date Noted   • Bacteremia 2023   • Ventral hernia 2023   • Sepsis with acute hypoxic respiratory failure ([facility] [address] ) 2023   • Alzheimer's dementia ([facility] [address] ) 2023   • Elevated troponin 2023   • Cirrhosis ([facility] [address] ) 2023   • Pain of left calf 2023   • Acute respiratory failure ([facility] [address] ) 2023   • Atherosclerotic heart disease 2023   • Hypothyroidism 2023      LOS (days): 5  Geometric Mean LOS (GMLOS) (days): 5 00  Days to GMLOS:0 2     OBJECTIVE:  Risk of Unplanned Readmission Score: 10 99         Current admission status: Inpatient   Preferred Pharmacy: No Pharmacies Listed  Primary Care Provider: Louis Mccall MD    Primary Insurance: [facility]  Secondary Insurance: [address] [place] [address] DETAILS:    Discharge planning discussed with[de-identified] [facility] and rehab staff  [place] of Choice: Yes  Comments - [place] of Choice: pt returning to [facility] daughter in agreement  CM contacted family/caregiver?: No- see comments (provider spoke with lourdes daughter)  Were Treatment Team discharge recommendations reviewed with patient/caregiver?: Yes  Did patient/caregiver verbalize understanding of patient care needs?: Yes  Were patient/caregiver advised of the risks associated with not following Treatment Team discharge recommendations?: Yes    Contacts  Reason/Outcome: Discharge Planning                   Would you like to participate in our [address] service program?  : No - Declined    Treatment Team Recommendation: SNF  Discharge Destination Plan[de-identified] SNF ([facility])              Spoke with [facility] nsg and rehab they are able to take patient back today       DC info faxed to [facility] per request of staff due to admission is closed today  Request placed in Round Trip for transportation back to [facility]  Confirmed  time 1300 by Rosa Elena BEST  Medical Necessity for transportation was completed  Copy available for transport team  along with face sheet  Provider notified daughter of discharge      Provider, facility  and nursing aware of  time

## 2023-06-26 NOTE — CONSULTS
Consultation - Infectious Disease   Karen Miranda 80 y o  female MRN: [id number]  Unit/Bed#: -01 Encounter: [id number]      Consults  Consult requested by Dr Shan Monson performed by Dr Schmidt Force:     1  This service was provided via Telemedicine  2  Provider located at [facility]  3  TeleMed provider: Derek Ledbetter MD   4  Identify all parties in room with patient during tele consult:RN  5  After connecting through [employer], patient was identified by name and date of birth and assistant checked wristband  Patient was then informed that this was a Telemedicine visit and that the exam was being conducted confidentially over secure lines  My office door was closed  No one else was in the room  Patient acknowledged consent and understanding of privacy and security of the Telemedicine visit, and gave us permission to have the assistant stay in the room in order to assist with the history and to conduct the exam   I informed the patient that I have reviewed their record in Epic and presented the opportunity for them to ask any questions regarding the visit today  The patient agreed to participate  Assessment/Recommendations     1  Sepsis, present on admission  -Evidenced by tachycardia, tachypnea with mild leukocytosis  Source of sepsis is likely complicated UTI and/for anaerobic bacteremia  She is clinically improved, afebrile without leukocytosis    · Management as below    2  Anerobic bacteremia  -Single blood culture on admission with Clostridium innocuum and para Bacteroides  Suspect due to gut translocation in the setting of acute GI illness and underlying cirrhosis  CT imaging without any infectious focus  No other obvious source of infection, patient has no open wounds    · Discontinue vancomycin, ceftriaxone and switch to oral metronidazole 500 3 times daily for an additional 7 days of therapy    3    Possible complicated UTI due to Proteus  -Diagnosis suspected with pyuria and bacteriuria with perivesical stranding on imaging  Patient is a poor historian and could not provide any localizing symptoms    · Patient completed 3 days of IV beta-lactam therapy    4  Cirrhosis, well compensated  -Noted incidentally on CT imaging, no previous known history  Risk factor for infection    5  Dementia    Discussed in detail with the primary service  History     Reason for Consult: Bacteremia  HPI: Sherman Martin is a 80y o  year old female with dementia, chronic kidney disease, history of TIA  She is a resident of a nursing home  History is limited from the patient and is obtained mainly from the medical records  She presented to the ER on 6/21 with acute onset shortness of breath, chest pain and she was hypoxic with sats of 83% at the nursing home  Prior to presentation to the ER she also had an episode of vomiting but did not report any abdominal pain, diarrhea or constipation  In the ER she had leukocytosis, lactic acidosis and urinalysis noted pyuria  CT of the chest and abdomen showed no infectious focus except for mild perivesical stranding suggestive of cystitis  She was admitted on [facility]  Urine cultures from admission grew Proteus and blood culture, single set has been finalized as Clostridium inoculum and para Bacteroides   She has been clinically stable, afebrile without leukocytosis with no new complaints  Infectious disease is being consulted for diagnostic work up and antibiotic management  Review of Systems  Pertinent positives and negatives as noted in HPI  Rest complete 12 point system-based review of systems is otherwise negative  PAST MEDICAL HISTORY:  History reviewed  No pertinent past medical history  History reviewed  No pertinent surgical history      FAMILY HISTORY:  Non-contributory    SOCIAL HISTORY:  Social History     Social History     Substance and Sexual Activity   Alcohol Use Never Social History     Substance and Sexual Activity   Drug Use Never     Social History     Tobacco Use   Smoking Status Never   Smokeless Tobacco Never       ALLERGIES:  Allergies   Allergen Reactions   • Ceftin [Cefuroxime] Other (See Comments)     Unsure at present         MEDICATIONS:  All current active medications have been reviewed  Physical Exam     Temp:  [97 7 °F (36 5 °C)-97 9 °F (36 6 °C)] 97 7 °F (36 5 °C)  HR:  [71-78] 75  Resp:  [16-20] 19  BP: (126-151)/(60-81) 151/74  SpO2:  [93 %-96 %] 96 %  Temp (24hrs), Av 8 °F (36 6 °C), Min:97 7 °F (36 5 °C), Max:97 9 °F (36 6 °C)  Current: Temperature: 97 7 °F (36 5 °C)    Intake/Output Summary (Last 24 hours) at 2023 1009  Last data filed at 2023 2231  Gross per 24 hour   Intake 145 ml   Output 480 ml   Net -335 ml         Physical exam findings reported by bedside and primary medical team staff    General Appearance:  Appearing chronically ill, frail, nontoxic, and in no distress, appears stated age   Head:  Normocephalic, without obvious abnormality, atraumatic   Eyes:  PERRL, conjunctiva pink and sclera anicteric, both eyes   Nose: Nares normal, mucosa normal, no drainage   Throat: Oropharynx moist without lesions; lips, mucosa, and tongue normal; teeth and gums normal   Neck: Supple, symmetrical, trachea midline, no adenopathy, no tenderness/mass/nodules   Back:   Symmetric, no curvature, ROM normal, no CVA tenderness   Lungs:   Clear to auscultation bilaterally, no audible wheezes, rhonchi and rales, respirations unlabored   Chest Wall:  No tenderness or deformity   Heart:  Regular rate and rhythm, S1, S2 normal, no murmur, rub or gallop   Abdomen:   Soft, non-tender, non-distended, positive bowel sounds, no masses, no organomegaly    No CVA tenderness   Extremities: Extremities normal, atraumatic, no cyanosis, clubbing or edema   Skin: Skin color, texture, turgor normal, no rashes or lesions  No draining wounds noted     Lymph nodes: Cervical, supraclavicular, and axillary nodes normal   Neurologic: Alert, not oriented times 3       Invasive Devices:   Peripheral IV 06/26/23 Left;Proximal;Ventral (anterior) Forearm (Active)       External Urinary Catheter (Active)   Output (mL) 175 mL 06/24/23 0334       Labs, Imaging, & Other Studies     Lab Results:    I have personally reviewed pertinent labs  Results from last 7 days   Lab Units 06/26/23  0516 06/25/23  0503 06/22/23  0536   WBC Thousand/uL 6 70 6 38 6 87   HEMOGLOBIN g/dL 13 1 12 0 11 7   PLATELETS Thousands/uL 158 148* 131*     Results from last 7 days   Lab Units 06/26/23  0516 06/25/23  0503 06/22/23  0536   POTASSIUM mmol/L 4 1 4 0 3 8   CHLORIDE mmol/L 106 105 102   CO2 mmol/L 29 29 30   BUN mg/dL 11 11 14   CREATININE mg/dL 0 81 0 81 0 95   EGFR ml/min/1 73sq m 62 62 51   CALCIUM mg/dL 9 0 8 5 8 2*   AST U/L 27 22 25   ALT U/L 28 25 20   ALK PHOS U/L 68 64 54     Results from last 7 days   Lab Units 06/24/23  1429 06/24/23  1405 06/21/23  2155 06/21/23  1439 06/21/23  1434 06/21/23  1416   BLOOD CULTURE  No Growth at 24 hrs  No Growth at 24 hrs  --  Clostridium innocuum*  Parabacteroides distasonis*  --  No Growth After 4 Days  GRAM STAIN RESULT   --   --   --  Gram negative rods*  Gram positive cocci in pairs*  --   --    URINE CULTURE   --   --   --   --  >100,000 cfu/ml Proteus mirabilis*  --    MRSA CULTURE ONLY   --   --  No Methicillin Resistant Staphlyococcus aureus (MRSA) isolated  --   --   --        Imaging Studies:   I have personally reviewed pertinent imaging study reports and images in PACS  EKG, Pathology, and Other Studies:   I have personally reviewed pertinent reports and reviewed external records  Counseling/Coordination of care: Total 90 minutes communication with the patient via telehealth    Labs, medical tests and imaging studies were independently and extensively reviewed by me as noted above in HPI and old records were obtained and summarized as noted above in HPI  My recommendations were discussed with the patient in detail who verbalized understanding

## 2023-06-26 NOTE — ASSESSMENT & PLAN NOTE
Anaerobic bacteremia  Blood culture shows no growth  Received IV antibiotic with trazodone and vancomycin  Will be discharged with p o  metronidazole for 7 days  ID consult appreciated

## 2023-06-26 NOTE — PROGRESS NOTES
Yony Simon is a 80 y o  female who is currently ordered Vancomycin IV with management by the Pharmacy Consult service  Relevant clinical data and objective / subjective history reviewed  Vancomycin Assessment:  Indication and Goal AUC/Trough: Pneumonia (goal -600, trough >10); Urinary tract infection (goal -600, trough >10); Bacteremia (goal -600, trough >10)  Clinical Status: stable  Micro:     Renal Function:  SCr: 0 81 mg/dL  CrCl: 45 3 mL/min  Renal replacement: Not on dialysis  Days of Therapy: 6  Current Dose: 1250 mg IV q 24 hours  Vancomycin Plan:  New Dosin mg IV q 24 hours  Estimated AUC: 501 mcg*hr/mL  Estimated Trough: 15 3 mcg/mL  Next Level: 23 at 0600  Renal Function Monitoring: Daily BMP and [place] will continue to follow closely for s/sx of nephrotoxicity, infusion reactions and appropriateness of therapy  BMP and CBC will be ordered per protocol  We will continue to follow the patient’s culture results and clinical progress daily      Rob Tuttle, Pharmacist

## 2023-06-27 LAB
ATRIAL RATE: 111 BPM
ATRIAL RATE: 121 BPM
ATRIAL RATE: 88 BPM
BACTERIA BLD CULT: NORMAL
P AXIS: 23 DEGREES
P AXIS: 35 DEGREES
P AXIS: 50 DEGREES
PR INTERVAL: 158 MS
PR INTERVAL: 170 MS
PR INTERVAL: 190 MS
QRS AXIS: -16 DEGREES
QRS AXIS: -2 DEGREES
QRS AXIS: 2 DEGREES
QRSD INTERVAL: 64 MS
QRSD INTERVAL: 66 MS
QRSD INTERVAL: 70 MS
QT INTERVAL: 302 MS
QT INTERVAL: 318 MS
QT INTERVAL: 370 MS
QTC INTERVAL: 428 MS
QTC INTERVAL: 432 MS
QTC INTERVAL: 447 MS
T WAVE AXIS: 65 DEGREES
T WAVE AXIS: 86 DEGREES
T WAVE AXIS: 87 DEGREES
VENTRICULAR RATE: 111 BPM
VENTRICULAR RATE: 121 BPM
VENTRICULAR RATE: 88 BPM

## 2023-06-27 PROCEDURE — 93010 ELECTROCARDIOGRAM REPORT: CPT | Performed by: INTERNAL MEDICINE

## 2023-06-27 NOTE — UTILIZATION REVIEW
NOTIFICATION OF ADMISSION DISCHARGE   This is a Notification of Discharge from [facility]  Please be advised that this patient has been discharge from our facility  Below you will find the admission and discharge date and time including the patient’s disposition  UTILIZATION REVIEW CONTACT:  [address] [place]  Utilization   Network Utilization Review Department  Phone: [phone] x carefully listen to the prompts  All voicemails are confidential   Email: [email or web address]     ADMISSION INFORMATION  PRESENTATION DATE: 6/21/2023  1:55 PM  OBERVATION ADMISSION DATE:   INPATIENT ADMISSION DATE: 6/21/23  4:49 PM   DISCHARGE DATE: 6/26/2023  1:13 PM   DISPOSITION:Discharged/Transferred to Long Term Care/Personal Care Home/Assisted Living    IMPORTANT INFORMATION:  Send all requests for admission clinical reviews, approved or denied determinations and any other requests to dedicated fax number below belonging to the campus where the patient is receiving treatment   List of dedicated fax numbers:  [address] DENIALS (Administrative/Medical Necessity) [phone]   [address] (Maternity/NICU/Pediatrics) [phone]   [facility] [phone]   [place] [address] [phone]   [address] [phone]   [address] [facility] [phone]   [address] [place], [place] [phone]   [address] [place] [address] [phone]   [facility]  [phone]   [address] [phone]   [address] [place] [address] [address] [facility] [phone]

## 2023-06-29 LAB
BACTERIA BLD CULT: NORMAL
BACTERIA BLD CULT: NORMAL

## 2023-06-30 LAB
ALL TARGETS: NOT DETECTED
BACTERIA BLD CULT: ABNORMAL
GRAM STN SPEC: ABNORMAL
GRAM STN SPEC: ABNORMAL